# Patient Record
Sex: MALE | Race: WHITE | Employment: UNEMPLOYED | URBAN - METROPOLITAN AREA
[De-identification: names, ages, dates, MRNs, and addresses within clinical notes are randomized per-mention and may not be internally consistent; named-entity substitution may affect disease eponyms.]

---

## 2017-04-10 ENCOUNTER — HOSPITAL ENCOUNTER (OUTPATIENT)
Dept: OTHER | Age: 42
Discharge: HOME | End: 2017-04-10
Attending: EMERGENCY MEDICINE | Admitting: EMERGENCY MEDICINE

## 2017-04-10 LAB
A/G RATIO: 1.2 (ref 1.1–2.2)
ALBUMIN SERPL-MCNC: 4.1 G/DL (ref 3.4–5)
ALP BLD-CCNC: 91 U/L (ref 40–129)
ALT SERPL-CCNC: 20 U/L (ref 10–40)
ANION GAP SERPL CALCULATED.3IONS-SCNC: 14 MMOL/L (ref 3–16)
AST SERPL-CCNC: 27 U/L (ref 15–37)
BASOPHILS ABSOLUTE: 0 K/UL (ref 0–0.2)
BASOPHILS RELATIVE PERCENT: 0.7 %
BILIRUB SERPL-MCNC: 0.3 MG/DL (ref 0–1)
BUN BLDV-MCNC: 11 MG/DL (ref 7–20)
CALCIUM SERPL-MCNC: 9.1 MG/DL (ref 8.3–10.6)
CHLORIDE BLD-SCNC: 99 MMOL/L (ref 99–110)
CO2: 26 MMOL/L (ref 21–32)
CREAT SERPL-MCNC: 0.6 MG/DL (ref 0.9–1.3)
EOSINOPHILS ABSOLUTE: 0.1 K/UL (ref 0–0.6)
EOSINOPHILS RELATIVE PERCENT: 2.2 %
GFR AFRICAN AMERICAN: >60
GFR NON-AFRICAN AMERICAN: >60
GLOBULIN: 3.5 G/DL
GLUCOSE BLD-MCNC: 94 MG/DL (ref 70–99)
HCT VFR BLD CALC: 47.6 % (ref 40.5–52.5)
HEMOGLOBIN: 16.4 G/DL (ref 13.5–17.5)
LYMPHOCYTES ABSOLUTE: 2.5 K/UL (ref 1–5.1)
LYMPHOCYTES RELATIVE PERCENT: 40.6 %
MCH RBC QN AUTO: 33.6 PG (ref 26–34)
MCHC RBC AUTO-ENTMCNC: 34.4 G/DL (ref 31–36)
MCV RBC AUTO: 97.8 FL (ref 80–100)
MONOCYTES ABSOLUTE: 0.7 K/UL (ref 0–1.3)
MONOCYTES RELATIVE PERCENT: 12.2 %
NEUTROPHILS ABSOLUTE: 2.7 K/UL (ref 1.7–7.7)
NEUTROPHILS RELATIVE PERCENT: 44.3 %
PDW BLD-RTO: 12.6 % (ref 12.4–15.4)
PLATELET # BLD: 125 K/UL (ref 135–450)
PMV BLD AUTO: 9.9 FL (ref 5–10.5)
POTASSIUM SERPL-SCNC: 4 MMOL/L (ref 3.5–5.1)
RBC # BLD: 4.87 M/UL (ref 4.2–5.9)
SODIUM BLD-SCNC: 139 MMOL/L (ref 136–145)
T4 FREE: 1.5 NG/DL (ref 0.9–1.8)
TOTAL PROTEIN: 7.6 G/DL (ref 6.4–8.2)
TSH SERPL DL<=0.05 MIU/L-ACNC: 1.45 UIU/ML (ref 0.27–4.2)
WBC # BLD: 6.1 K/UL (ref 4–11)

## 2017-04-11 LAB
HBV SURFACE AB TITR SER: >1000 MUL/ML
HEPATITIS C ANTIBODY INTERPRETATION: REACTIVE
HIV-1 AND HIV-2 ANTIBODIES: NORMAL
RPR: NORMAL

## 2017-04-13 LAB — HEPATITIS B SURFACE ANTIGEN INTERPRETATION: NORMAL

## 2019-11-29 ENCOUNTER — HOSPITAL ENCOUNTER (EMERGENCY)
Age: 44
Discharge: HOME | End: 2019-11-29
Attending: EMERGENCY MEDICINE
Payer: COMMERCIAL

## 2019-11-29 VITALS
SYSTOLIC BLOOD PRESSURE: 141 MMHG | HEIGHT: 70 IN | BODY MASS INDEX: 19.76 KG/M2 | OXYGEN SATURATION: 100 % | DIASTOLIC BLOOD PRESSURE: 92 MMHG | HEART RATE: 67 BPM | WEIGHT: 138 LBS | TEMPERATURE: 97.8 F | RESPIRATION RATE: 14 BRPM

## 2019-11-29 DIAGNOSIS — R07.9 CHEST PAIN, UNSPECIFIED TYPE: Primary | ICD-10-CM

## 2019-11-29 DIAGNOSIS — F19.10 SUBSTANCE ABUSE (HCC): ICD-10-CM

## 2019-11-29 LAB
A/G RATIO: 1.4 (ref 1.1–2.2)
ALBUMIN SERPL-MCNC: 4.9 G/DL (ref 3.4–5)
ALP BLD-CCNC: 105 U/L (ref 40–129)
ALT SERPL-CCNC: 38 U/L (ref 10–40)
ANION GAP SERPL CALCULATED.3IONS-SCNC: 10 MMOL/L (ref 3–16)
AST SERPL-CCNC: 45 U/L (ref 15–37)
BASOPHILS ABSOLUTE: 0 K/UL (ref 0–0.2)
BASOPHILS RELATIVE PERCENT: 0.6 %
BILIRUB SERPL-MCNC: 0.5 MG/DL (ref 0–1)
BUN BLDV-MCNC: 11 MG/DL (ref 7–20)
CALCIUM SERPL-MCNC: 10.2 MG/DL (ref 8.3–10.6)
CHLORIDE BLD-SCNC: 97 MMOL/L (ref 99–110)
CO2: 25 MMOL/L (ref 21–32)
CREAT SERPL-MCNC: 0.8 MG/DL (ref 0.9–1.3)
EKG ATRIAL RATE: 94 BPM
EKG DIAGNOSIS: NORMAL
EKG P AXIS: 72 DEGREES
EKG P-R INTERVAL: 154 MS
EKG Q-T INTERVAL: 346 MS
EKG QRS DURATION: 90 MS
EKG QTC CALCULATION (BAZETT): 432 MS
EKG R AXIS: 8 DEGREES
EKG T AXIS: 55 DEGREES
EKG VENTRICULAR RATE: 94 BPM
EOSINOPHILS ABSOLUTE: 0.1 K/UL (ref 0–0.6)
EOSINOPHILS RELATIVE PERCENT: 1.6 %
GFR AFRICAN AMERICAN: >60
GFR NON-AFRICAN AMERICAN: >60
GLOBULIN: 3.6 G/DL
GLUCOSE BLD-MCNC: 136 MG/DL (ref 70–99)
HCT VFR BLD CALC: 40.3 % (ref 40.5–52.5)
HEMOGLOBIN: 14 G/DL (ref 13.5–17.5)
LYMPHOCYTES ABSOLUTE: 1.8 K/UL (ref 1–5.1)
LYMPHOCYTES RELATIVE PERCENT: 29.8 %
MAGNESIUM: 2 MG/DL (ref 1.8–2.4)
MCH RBC QN AUTO: 32.9 PG (ref 26–34)
MCHC RBC AUTO-ENTMCNC: 34.6 G/DL (ref 31–36)
MCV RBC AUTO: 95.1 FL (ref 80–100)
MONOCYTES ABSOLUTE: 0.5 K/UL (ref 0–1.3)
MONOCYTES RELATIVE PERCENT: 8 %
NEUTROPHILS ABSOLUTE: 3.7 K/UL (ref 1.7–7.7)
NEUTROPHILS RELATIVE PERCENT: 60 %
PDW BLD-RTO: 11.7 % (ref 12.4–15.4)
PLATELET # BLD: 197 K/UL (ref 135–450)
PMV BLD AUTO: 7.5 FL (ref 5–10.5)
POTASSIUM REFLEX MAGNESIUM: 3.1 MMOL/L (ref 3.5–5.1)
RBC # BLD: 4.24 M/UL (ref 4.2–5.9)
SODIUM BLD-SCNC: 132 MMOL/L (ref 136–145)
TOTAL PROTEIN: 8.5 G/DL (ref 6.4–8.2)
TROPONIN: <0.01 NG/ML
TROPONIN: <0.01 NG/ML
WBC # BLD: 6.2 K/UL (ref 4–11)

## 2019-11-29 PROCEDURE — 85025 COMPLETE CBC W/AUTO DIFF WBC: CPT

## 2019-11-29 PROCEDURE — 6370000000 HC RX 637 (ALT 250 FOR IP): Performed by: EMERGENCY MEDICINE

## 2019-11-29 PROCEDURE — 93005 ELECTROCARDIOGRAM TRACING: CPT | Performed by: EMERGENCY MEDICINE

## 2019-11-29 PROCEDURE — 93010 ELECTROCARDIOGRAM REPORT: CPT | Performed by: INTERNAL MEDICINE

## 2019-11-29 PROCEDURE — 83735 ASSAY OF MAGNESIUM: CPT

## 2019-11-29 PROCEDURE — 80053 COMPREHEN METABOLIC PANEL: CPT

## 2019-11-29 PROCEDURE — 84484 ASSAY OF TROPONIN QUANT: CPT

## 2019-11-29 PROCEDURE — 99285 EMERGENCY DEPT VISIT HI MDM: CPT

## 2019-11-29 RX ADMIN — NITROGLYCERIN 1 INCH: 20 OINTMENT TOPICAL at 03:03

## 2019-11-29 SDOH — HEALTH STABILITY: MENTAL HEALTH: HOW OFTEN DO YOU HAVE A DRINK CONTAINING ALCOHOL?: NEVER

## 2019-11-29 ASSESSMENT — PAIN DESCRIPTION - FREQUENCY: FREQUENCY: CONTINUOUS

## 2019-11-29 ASSESSMENT — PAIN DESCRIPTION - PAIN TYPE: TYPE: ACUTE PAIN

## 2019-11-29 ASSESSMENT — PAIN SCALES - GENERAL: PAINLEVEL_OUTOF10: 5

## 2019-11-29 ASSESSMENT — PAIN DESCRIPTION - DESCRIPTORS: DESCRIPTORS: CONSTANT

## 2019-11-29 ASSESSMENT — PAIN DESCRIPTION - LOCATION: LOCATION: CHEST

## 2019-11-29 ASSESSMENT — HEART SCORE: ECG: 1

## 2020-09-07 ENCOUNTER — APPOINTMENT (OUTPATIENT)
Dept: GENERAL RADIOLOGY | Age: 45
DRG: 383 | End: 2020-09-07
Payer: COMMERCIAL

## 2020-09-07 ENCOUNTER — HOSPITAL ENCOUNTER (INPATIENT)
Age: 45
LOS: 1 days | Discharge: HOME | DRG: 383 | End: 2020-09-08
Attending: EMERGENCY MEDICINE | Admitting: INTERNAL MEDICINE
Payer: COMMERCIAL

## 2020-09-07 PROBLEM — M00.9 SEPTIC JOINT (HCC): Status: ACTIVE | Noted: 2020-09-07

## 2020-09-07 LAB
A/G RATIO: 1.3 (ref 1.1–2.2)
ALBUMIN SERPL-MCNC: 4.4 G/DL (ref 3.4–5)
ALP BLD-CCNC: 170 U/L (ref 40–129)
ALT SERPL-CCNC: 29 U/L (ref 10–40)
ANION GAP SERPL CALCULATED.3IONS-SCNC: 6 MMOL/L (ref 3–16)
APPEARANCE FLUID: NORMAL
AST SERPL-CCNC: 31 U/L (ref 15–37)
BASOPHILS ABSOLUTE: 0.1 K/UL (ref 0–0.2)
BASOPHILS RELATIVE PERCENT: 0.7 %
BILIRUB SERPL-MCNC: 0.3 MG/DL (ref 0–1)
BUN BLDV-MCNC: 17 MG/DL (ref 7–20)
C-REACTIVE PROTEIN: 10.6 MG/L (ref 0–5.1)
CALCIUM SERPL-MCNC: 9.6 MG/DL (ref 8.3–10.6)
CELL COUNT FLUID TYPE: NORMAL
CHLORIDE BLD-SCNC: 96 MMOL/L (ref 99–110)
CLOT EVALUATION: NORMAL
CO2: 30 MMOL/L (ref 21–32)
COLOR FLUID: YELLOW
CREAT SERPL-MCNC: 0.7 MG/DL (ref 0.9–1.3)
CRYSTALS, FLUID: NORMAL
EOSINOPHILS ABSOLUTE: 0.1 K/UL (ref 0–0.6)
EOSINOPHILS RELATIVE PERCENT: 0.9 %
GFR AFRICAN AMERICAN: >60
GFR NON-AFRICAN AMERICAN: >60
GLOBULIN: 3.5 G/DL
GLUCOSE BLD-MCNC: 110 MG/DL (ref 70–99)
HCT VFR BLD CALC: 37.3 % (ref 40.5–52.5)
HEMOGLOBIN: 12.3 G/DL (ref 13.5–17.5)
LACTIC ACID: 1.3 MMOL/L (ref 0.4–2)
LYMPHOCYTES ABSOLUTE: 1.3 K/UL (ref 1–5.1)
LYMPHOCYTES RELATIVE PERCENT: 18 %
LYMPHOCYTES, BODY FLUID: 3 %
MCH RBC QN AUTO: 30.4 PG (ref 26–34)
MCHC RBC AUTO-ENTMCNC: 33.1 G/DL (ref 31–36)
MCV RBC AUTO: 91.7 FL (ref 80–100)
MONOCYTE, FLUID: 6 %
MONOCYTES ABSOLUTE: 0.7 K/UL (ref 0–1.3)
MONOCYTES RELATIVE PERCENT: 10.3 %
NEUTROPHIL, FLUID: 91 %
NEUTROPHILS ABSOLUTE: 5 K/UL (ref 1.7–7.7)
NEUTROPHILS RELATIVE PERCENT: 70.1 %
NUCLEATED CELLS FLUID: NORMAL /CUMM
NUMBER OF CELLS COUNTED FLUID: 100
PDW BLD-RTO: 12 % (ref 12.4–15.4)
PLATELET # BLD: 208 K/UL (ref 135–450)
PMV BLD AUTO: 7.1 FL (ref 5–10.5)
POTASSIUM REFLEX MAGNESIUM: 4.4 MMOL/L (ref 3.5–5.1)
RBC # BLD: 4.06 M/UL (ref 4.2–5.9)
RBC FLUID: 3400 /CUMM
SEDIMENTATION RATE, ERYTHROCYTE: 52 MM/HR (ref 0–15)
SODIUM BLD-SCNC: 132 MMOL/L (ref 136–145)
SOURCE BODY FLUID: NORMAL
TOTAL PROTEIN: 7.9 G/DL (ref 6.4–8.2)
WBC # BLD: 7.2 K/UL (ref 4–11)

## 2020-09-07 PROCEDURE — 6360000002 HC RX W HCPCS: Performed by: INTERNAL MEDICINE

## 2020-09-07 PROCEDURE — 85025 COMPLETE CBC W/AUTO DIFF WBC: CPT

## 2020-09-07 PROCEDURE — 83605 ASSAY OF LACTIC ACID: CPT

## 2020-09-07 PROCEDURE — 85652 RBC SED RATE AUTOMATED: CPT

## 2020-09-07 PROCEDURE — 6370000000 HC RX 637 (ALT 250 FOR IP): Performed by: INTERNAL MEDICINE

## 2020-09-07 PROCEDURE — 1200000000 HC SEMI PRIVATE

## 2020-09-07 PROCEDURE — 20610 DRAIN/INJ JOINT/BURSA W/O US: CPT

## 2020-09-07 PROCEDURE — 6360000002 HC RX W HCPCS: Performed by: PHYSICIAN ASSISTANT

## 2020-09-07 PROCEDURE — 6360000002 HC RX W HCPCS: Performed by: EMERGENCY MEDICINE

## 2020-09-07 PROCEDURE — 2580000003 HC RX 258: Performed by: PHYSICIAN ASSISTANT

## 2020-09-07 PROCEDURE — 87205 SMEAR GRAM STAIN: CPT

## 2020-09-07 PROCEDURE — 6370000000 HC RX 637 (ALT 250 FOR IP): Performed by: EMERGENCY MEDICINE

## 2020-09-07 PROCEDURE — 89060 EXAM SYNOVIAL FLUID CRYSTALS: CPT

## 2020-09-07 PROCEDURE — 87077 CULTURE AEROBIC IDENTIFY: CPT

## 2020-09-07 PROCEDURE — 73562 X-RAY EXAM OF KNEE 3: CPT

## 2020-09-07 PROCEDURE — 96374 THER/PROPH/DIAG INJ IV PUSH: CPT

## 2020-09-07 PROCEDURE — 99285 EMERGENCY DEPT VISIT HI MDM: CPT

## 2020-09-07 PROCEDURE — 86140 C-REACTIVE PROTEIN: CPT

## 2020-09-07 PROCEDURE — 87070 CULTURE OTHR SPECIMN AEROBIC: CPT

## 2020-09-07 PROCEDURE — 87186 SC STD MICRODIL/AGAR DIL: CPT

## 2020-09-07 PROCEDURE — 80053 COMPREHEN METABOLIC PANEL: CPT

## 2020-09-07 PROCEDURE — 89051 BODY FLUID CELL COUNT: CPT

## 2020-09-07 RX ORDER — KETOROLAC TROMETHAMINE 30 MG/ML
30 INJECTION, SOLUTION INTRAMUSCULAR; INTRAVENOUS EVERY 6 HOURS PRN
Status: DISCONTINUED | OUTPATIENT
Start: 2020-09-07 | End: 2020-09-08 | Stop reason: HOSPADM

## 2020-09-07 RX ORDER — ACETAMINOPHEN 325 MG/1
650 TABLET ORAL EVERY 4 HOURS PRN
Status: DISCONTINUED | OUTPATIENT
Start: 2020-09-07 | End: 2020-09-07

## 2020-09-07 RX ORDER — KETOROLAC TROMETHAMINE 30 MG/ML
15 INJECTION, SOLUTION INTRAMUSCULAR; INTRAVENOUS ONCE
Status: COMPLETED | OUTPATIENT
Start: 2020-09-07 | End: 2020-09-07

## 2020-09-07 RX ORDER — ONDANSETRON 2 MG/ML
4 INJECTION INTRAMUSCULAR; INTRAVENOUS EVERY 6 HOURS PRN
Status: DISCONTINUED | OUTPATIENT
Start: 2020-09-07 | End: 2020-09-08 | Stop reason: HOSPADM

## 2020-09-07 RX ORDER — ACETAMINOPHEN 650 MG/1
650 SUPPOSITORY RECTAL EVERY 6 HOURS PRN
Status: DISCONTINUED | OUTPATIENT
Start: 2020-09-07 | End: 2020-09-08 | Stop reason: HOSPADM

## 2020-09-07 RX ORDER — ACETAMINOPHEN 325 MG/1
650 TABLET ORAL EVERY 6 HOURS PRN
Status: DISCONTINUED | OUTPATIENT
Start: 2020-09-07 | End: 2020-09-08 | Stop reason: HOSPADM

## 2020-09-07 RX ORDER — SODIUM CHLORIDE 0.9 % (FLUSH) 0.9 %
10 SYRINGE (ML) INJECTION PRN
Status: DISCONTINUED | OUTPATIENT
Start: 2020-09-07 | End: 2020-09-08 | Stop reason: HOSPADM

## 2020-09-07 RX ORDER — POLYETHYLENE GLYCOL 3350 17 G/17G
17 POWDER, FOR SOLUTION ORAL DAILY PRN
Status: DISCONTINUED | OUTPATIENT
Start: 2020-09-07 | End: 2020-09-08 | Stop reason: HOSPADM

## 2020-09-07 RX ORDER — SODIUM CHLORIDE 9 MG/ML
INJECTION, SOLUTION INTRAVENOUS
Status: DISPENSED
Start: 2020-09-07 | End: 2020-09-08

## 2020-09-07 RX ORDER — PROMETHAZINE HYDROCHLORIDE 25 MG/1
12.5 TABLET ORAL EVERY 6 HOURS PRN
Status: DISCONTINUED | OUTPATIENT
Start: 2020-09-07 | End: 2020-09-08 | Stop reason: HOSPADM

## 2020-09-07 RX ORDER — SODIUM CHLORIDE 0.9 % (FLUSH) 0.9 %
10 SYRINGE (ML) INJECTION EVERY 12 HOURS SCHEDULED
Status: DISCONTINUED | OUTPATIENT
Start: 2020-09-07 | End: 2020-09-08 | Stop reason: HOSPADM

## 2020-09-07 RX ADMIN — BUPRENORPHINE HCL 10 MG: 8 TABLET SUBLINGUAL at 20:57

## 2020-09-07 RX ADMIN — ACETAMINOPHEN 650 MG: 325 TABLET ORAL at 08:30

## 2020-09-07 RX ADMIN — KETOROLAC TROMETHAMINE 30 MG: 30 INJECTION, SOLUTION INTRAMUSCULAR at 23:30

## 2020-09-07 RX ADMIN — Medication 10 ML: at 20:58

## 2020-09-07 RX ADMIN — CEFEPIME HYDROCHLORIDE 2 G: 2 INJECTION, POWDER, FOR SOLUTION INTRAVENOUS at 16:56

## 2020-09-07 RX ADMIN — KETOROLAC TROMETHAMINE 15 MG: 30 INJECTION, SOLUTION INTRAMUSCULAR at 08:30

## 2020-09-07 RX ADMIN — VANCOMYCIN HYDROCHLORIDE 1.5 G: 10 INJECTION, POWDER, LYOPHILIZED, FOR SOLUTION INTRAVENOUS at 18:45

## 2020-09-07 RX ADMIN — KETOROLAC TROMETHAMINE 30 MG: 30 INJECTION, SOLUTION INTRAMUSCULAR at 16:57

## 2020-09-07 RX ADMIN — ENOXAPARIN SODIUM 40 MG: 40 INJECTION SUBCUTANEOUS at 16:57

## 2020-09-07 ASSESSMENT — PAIN DESCRIPTION - LOCATION
LOCATION: KNEE

## 2020-09-07 ASSESSMENT — PAIN DESCRIPTION - PAIN TYPE
TYPE: ACUTE PAIN

## 2020-09-07 ASSESSMENT — PAIN DESCRIPTION - ORIENTATION
ORIENTATION: RIGHT

## 2020-09-07 ASSESSMENT — VISUAL ACUITY
OD: 20/25
OU: 20/13
OS: 20/40

## 2020-09-07 ASSESSMENT — PAIN SCALES - GENERAL
PAINLEVEL_OUTOF10: 5
PAINLEVEL_OUTOF10: 10
PAINLEVEL_OUTOF10: 0
PAINLEVEL_OUTOF10: 5
PAINLEVEL_OUTOF10: 6
PAINLEVEL_OUTOF10: 10

## 2020-09-07 NOTE — ED PROVIDER NOTES
file     Emotionally abused: Not on file     Physically abused: Not on file     Forced sexual activity: Not on file   Other Topics Concern    Not on file   Social History Narrative    Not on file     No current facility-administered medications for this encounter. No current outpatient medications on file.      Facility-Administered Medications Ordered in Other Encounters   Medication Dose Route Frequency Provider Last Rate Last Dose    lactated ringers infusion   Intravenous Continuous Betzaida Vivar MD        lidocaine PF 1 % injection 1 mL  1 mL Intradermal Once PRN Betzaida Vivar MD        sodium chloride flush 0.9 % injection 10 mL  10 mL Intravenous 2 times per day Betzaida Vivar MD   10 mL at 09/08/20 1227    sodium chloride flush 0.9 % injection 10 mL  10 mL Intravenous PRN Betzaida Vivar MD        acetaminophen (TYLENOL) tablet 650 mg  650 mg Oral Q6H PRN Vernon Amezcua MD   650 mg at 09/08/20 1227    Or    acetaminophen (TYLENOL) suppository 650 mg  650 mg Rectal Q6H PRN Vernon Amezcua MD        polyethylene glycol (GLYCOLAX) packet 17 g  17 g Oral Daily PRN Vernon Amezcua MD        promethazine (PHENERGAN) tablet 12.5 mg  12.5 mg Oral Q6H PRN Vernon Amezcua MD        Or    ondansetron [facility]) injection 4 mg  4 mg Intravenous Q6H PRN Vernon Amezcua MD        enoxaparin (LOVENOX) injection 40 mg  40 mg Subcutaneous Daily Vernon Amezcua MD        ketorolac (TORADOL) injection 30 mg  30 mg Intravenous Q6H PRN Vernon Amezcua MD        cefepime (MAXIPIME) 2 g IVPB minibag  2 g Intravenous Q12H Vernon Amezcua MD        vancomycin 1000 mg IVPB in 250 mL D5W addavial  1,000 mg Intravenous Q12H Vernon Amezcua MD        buprenorphine (SUBUTEX) SL tablet 10 mg  10 mg Sublingual BID Vernon Amezcua MD        0.9 % sodium chloride infusion   Intravenous Continuous Vernon Amezcua MD 75 mL/hr at 09/08/20 1327      famotidine (PEPCID) tablet 20 mg  20 mg Oral BID Vernon Amezcua MD 20 mg at 09/08/20 1227    morphine (PF) injection 2 mg  2 mg Intravenous Q4H PRN Eloise Tinoco MD   2 mg at 09/08/20 1325     Allergies   Allergen Reactions    Ciprofloxacin Other (See Comments)     Passed out       REVIEW OF SYSTEMS  10 systems reviewed, pertinent positives per HPI otherwise noted to be negative. PHYSICAL EXAM  /87   Pulse 77   Temp 97.3 °F (36.3 °C) (Oral)   Resp 18   Ht 5' 9\" (1.753 m)   Wt 139 lb (63 kg)   SpO2 98%   BMI 20.53 kg/m²    GENERAL APPEARANCE: Awake and alert. Cooperative. No acute distress. HENT: Normocephalic. Atraumatic. CN  2-12 grossly intact. HEART/CHEST: RRR. No murmurs appreciated  LUNGS: Respirations unlabored. Speaking comfortably in full sentences. CTAB. ABDOMEN: Soft, non-distended abdomen. Non tender to palpation. No guarding. No rebound. EXTREMITIES: 2 cm laceration present over the right medial and upper knee. Edema and warmth over the right knee. No overlying erythema. Decreased flexion at the right knee due to pain. Able to move all toes. 2+ DP and radial pulses bilaterally. Sensation intact all extremities. [facility] SKIN: Warm and dry. No acute rashes. NEUROLOGICAL: Alert and oriented. CN's 2-12 intact. No gross facial drooping. Strength 5/5, sensation intact. PSYCHIATRIC: Normal mood and affect. LABS  Results for orders placed or performed during the hospital encounter of 09/07/20   Culture, Body Fluid    Specimen: Synovial Fluid;  Body Fluid   Result Value Ref Range    Body Fluid Culture, Sterile No growth to date     Gram Stain Result 4+ WBC's (Polymorphonuclear)  No organisms seen      CBC auto differential   Result Value Ref Range    WBC 7.2 4.0 - 11.0 K/uL    RBC 4.06 (L) 4.20 - 5.90 M/uL    Hemoglobin 12.3 (L) 13.5 - 17.5 g/dL    Hematocrit 37.3 (L) 40.5 - 52.5 %    MCV 91.7 80.0 - 100.0 fL    MCH 30.4 26.0 - 34.0 pg    MCHC 33.1 31.0 - 36.0 g/dL    RDW 12.0 (L) 12.4 - 15.4 %    Platelets 503 621 - 091 K/uL    MPV 7.1 5.0 - 10.5 fL Neutrophils % 70.1 %    Lymphocytes % 18.0 %    Monocytes % 10.3 %    Eosinophils % 0.9 %    Basophils % 0.7 %    Neutrophils Absolute 5.0 1.7 - 7.7 K/uL    Lymphocytes Absolute 1.3 1.0 - 5.1 K/uL    Monocytes Absolute 0.7 0.0 - 1.3 K/uL    Eosinophils Absolute 0.1 0.0 - 0.6 K/uL    Basophils Absolute 0.1 0.0 - 0.2 K/uL   Comprehensive Metabolic Panel w/ Reflex to MG   Result Value Ref Range    Sodium 132 (L) 136 - 145 mmol/L    Potassium reflex Magnesium 4.4 3.5 - 5.1 mmol/L    Chloride 96 (L) 99 - 110 mmol/L    CO2 30 21 - 32 mmol/L    Anion Gap 6 3 - 16    Glucose 110 (H) 70 - 99 mg/dL    BUN 17 7 - 20 mg/dL    CREATININE 0.7 (L) 0.9 - 1.3 mg/dL    GFR Non-African American >60 >60    GFR African American >60 >60    Calcium 9.6 8.3 - 10.6 mg/dL    Total Protein 7.9 6.4 - 8.2 g/dL    Alb 4.4 3.4 - 5.0 g/dL    Albumin/Globulin Ratio 1.3 1.1 - 2.2    Total Bilirubin 0.3 0.0 - 1.0 mg/dL    Alkaline Phosphatase 170 (H) 40 - 129 U/L    ALT 29 10 - 40 U/L    AST 31 15 - 37 U/L    Globulin 3.5 g/dL   Lactic acid, plasma   Result Value Ref Range    Lactic Acid 1.3 0.4 - 2.0 mmol/L   Sedimentation Rate   Result Value Ref Range    Sed Rate 52 (H) 0 - 15 mm/Hr   C-reactive protein   Result Value Ref Range    CRP 10.6 (H) 0.0 - 5.1 mg/L   Body fluid cell count with differential   Result Value Ref Range    Cell Count Fluid Type Knee   Right     Color, Fluid Yellow     Appearance, Fluid Hazy     Clot Eval. see below     Nucl Cell, Fluid 55,978 /cumm    RBC, Fluid 3,400 /cumm    Neutrophil Count, Fluid 91 %    Lymphocytes, Body Fluid 3 %    Monocyte Count, Fluid 6 %    Number of Cells Counted Fluid 100    Body fluid crystal   Result Value Ref Range    Source Body Fluid Knee   Right     Crystals, Fluid None Seen    Basic Metabolic Panel w/ Reflex to MG   Result Value Ref Range    Sodium 134 (L) 136 - 145 mmol/L    Potassium reflex Magnesium 4.6 3.5 - 5.1 mmol/L    Chloride 99 99 - 110 mmol/L    CO2 27 21 - 32 mmol/L    Anion Gap 8 3 - 16    Glucose 96 70 - 99 mg/dL    BUN 20 7 - 20 mg/dL    CREATININE 0.7 (L) 0.9 - 1.3 mg/dL    GFR Non-African American >60 >60    GFR African American >60 >60    Calcium 8.8 8.3 - 10.6 mg/dL   CBC auto differential   Result Value Ref Range    WBC 7.1 4.0 - 11.0 K/uL    RBC 3.95 (L) 4.20 - 5.90 M/uL    Hemoglobin 12.6 (L) 13.5 - 17.5 g/dL    Hematocrit 37.3 (L) 40.5 - 52.5 %    MCV 94.5 80.0 - 100.0 fL    MCH 31.9 26.0 - 34.0 pg    MCHC 33.8 31.0 - 36.0 g/dL    RDW 12.1 (L) 12.4 - 15.4 %    Platelets 105 349 - 841 K/uL    MPV 7.5 5.0 - 10.5 fL    Neutrophils % 55.0 %    Lymphocytes % 29.5 %    Monocytes % 12.4 %    Eosinophils % 1.5 %    Basophils % 1.6 %    Neutrophils Absolute 3.9 1.7 - 7.7 K/uL    Lymphocytes Absolute 2.1 1.0 - 5.1 K/uL    Monocytes Absolute 0.9 0.0 - 1.3 K/uL    Eosinophils Absolute 0.1 0.0 - 0.6 K/uL    Basophils Absolute 0.1 0.0 - 0.2 K/uL       I have reviewed all labs for this visit. RADIOLOGY  Xr Knee Right (3 Views)    Result Date: 9/7/2020  EXAMINATION: THREE XRAY VIEWS OF THE RIGHT KNEE 9/7/2020 8:28 am COMPARISON: None. HISTORY: ORDERING SYSTEM PROVIDED HISTORY: pain TECHNOLOGIST PROVIDED HISTORY: Reason for exam:->pain Reason for Exam: laceration one week ago, pain and swelling started one day ago Acuity: Acute Type of Exam: Initial Remote history of osteomyelitis in the femur. FINDINGS: There is severe soft tissue swelling anterior to the distal shaft of the femur with questionable suprapatellar joint effusion but is difficult to differentiate from more superficial swelling. No subcutaneous gas or ulceration. Site of laceration cannot be clearly identified. There is abnormal decreased density extending from the diaphysis of the femur distally to the intercondylar portion. This appearance has progressed since 2013 comparison plain film. Prior MRI evaluation has made characterisation of osteomyelitis in 2012.   Current findings may represent sequela of remote/chronic injection 15 mg (15 mg Intravenous Given 9/7/20 0830)   vancomycin 1.5 g in dextrose 5% 300 mL IVPB (0 g Intravenous Stopped 9/7/20 2045)        CLINICAL IMPRESSION  1. Pain and swelling of knee, right        Blood pressure 133/87, pulse 77, temperature 97.3 °F (36.3 °C), temperature source Oral, resp. rate 18, height 5' 9\" (1.753 m), weight 139 lb (63 kg), SpO2 98 %. DISPOSITION  Mulugeta Kauffmankeeper was admitted. Patient was given scripts for the following medications. I counseled patient how to take these medications. Discharge Medication List as of 9/8/2020  8:57 AM          Follow-up with:  Lillie Barrera, APRN - CNP  [address] [facility] [id number]  [place] [id number] [phone]            DISCLAIMER: This chart was created using Dragon dictation software. Efforts were made by me to ensure accuracy, however some errors may be present due to limitations of this technology and occasionally words are not transcribed correctly.         Alf Lux MD  09/08/20 1923

## 2020-09-07 NOTE — CONSULTS
Pharmacy Note  Vancomycin Consult    Melva Lema is a 40 y.o. male started on Vancomycin for osteomyelitis; consult received from Rita Murphy, to manage therapy. Also receiving the following antibiotics: Cefepime. Patient Active Problem List   Diagnosis    GIB (gastrointestinal bleeding)    Duodenal ulcer    Chronic knee pain    Hepatitis C    Tobacco abuse    Knee pain    Plica of knee    Chondromalacia of right knee    Plica syndrome of right knee    Status post arthroscopy of knee    Septic joint (HCC)       Allergies:  Ciprofloxacin     Temp max: 97 degrees F    Recent Labs     09/07/20  0818   BUN 17       Recent Labs     09/07/20  0818   CREATININE 0.7*       Recent Labs     09/07/20  0818   WBC 7.2       No intake or output data in the 24 hours ending 09/07/20 1649    Culture Date      Source                       Results      Ht Readings from Last 1 Encounters:   09/07/20 5' 9\" (1.753 m)        Wt Readings from Last 1 Encounters:   09/07/20 139 lb (63 kg)         Body mass index is 20.53 kg/m². Estimated Creatinine Clearance: 120 mL/min (A) (based on SCr of 0.7 mg/dL (L)). Goal Trough Level: 15-20 mcg/mL    Assessment/Plan:  Will initiate Vancomycin with a one time loading dose of 1500 mg x1, followed by 1000 mg IV every 12 hours. Timing of trough level will be determined based on culture results, renal function, and clinical response. Thank you for the consult. Will continue to follow.   TOMMY Horan.Ph.9/7/20204:51 PM

## 2020-09-07 NOTE — PROGRESS NOTES
Pt assisted to unit via w/c, stand by assist with tranfers, pt unable to put muc wt on righ tleg due to laceration to knee pain. Oriented to room and unit, reviewed plan of care. Admission assessment complete. Call light in reach.

## 2020-09-07 NOTE — ED NOTES
Report given to [facility]. Evan Che transporting pt to floor via wheelchair.      Kori Galaviz RN  09/07/20 2192

## 2020-09-07 NOTE — PROGRESS NOTES
Consult has been called to Christine anderson  on 9/7/20. P.s .  Christine anderson 4:59 PM    Boone Quesada  9/7/2020

## 2020-09-07 NOTE — PLAN OF CARE
Admit to Med Surg    Right Knee Swelling  - poss septic joint  - poss osteo - old sequela vs acute on XR; pt has prior hx of osteo  - hx of IVDA, has been clean for several years

## 2020-09-08 ENCOUNTER — HOSPITAL ENCOUNTER (INPATIENT)
Age: 45
LOS: 3 days | Discharge: HOME | DRG: 313 | End: 2020-09-11
Attending: HOSPITALIST | Admitting: HOSPITALIST
Payer: COMMERCIAL

## 2020-09-08 ENCOUNTER — ANESTHESIA EVENT (OUTPATIENT)
Dept: OPERATING ROOM | Age: 45
DRG: 313 | End: 2020-09-08
Payer: COMMERCIAL

## 2020-09-08 ENCOUNTER — APPOINTMENT (OUTPATIENT)
Dept: MRI IMAGING | Age: 45
DRG: 313 | End: 2020-09-08
Attending: HOSPITALIST
Payer: COMMERCIAL

## 2020-09-08 VITALS
RESPIRATION RATE: 18 BRPM | HEART RATE: 77 BPM | TEMPERATURE: 97.3 F | OXYGEN SATURATION: 98 % | BODY MASS INDEX: 20.59 KG/M2 | HEIGHT: 69 IN | WEIGHT: 139 LBS | DIASTOLIC BLOOD PRESSURE: 87 MMHG | SYSTOLIC BLOOD PRESSURE: 133 MMHG

## 2020-09-08 PROBLEM — M00.9 SEPTIC ARTHRITIS (HCC): Status: ACTIVE | Noted: 2020-09-08

## 2020-09-08 LAB
ANION GAP SERPL CALCULATED.3IONS-SCNC: 8 MMOL/L (ref 3–16)
BASOPHILS ABSOLUTE: 0.1 K/UL (ref 0–0.2)
BASOPHILS RELATIVE PERCENT: 1.6 %
BUN BLDV-MCNC: 20 MG/DL (ref 7–20)
CALCIUM SERPL-MCNC: 8.8 MG/DL (ref 8.3–10.6)
CHLORIDE BLD-SCNC: 99 MMOL/L (ref 99–110)
CO2: 27 MMOL/L (ref 21–32)
CREAT SERPL-MCNC: 0.7 MG/DL (ref 0.9–1.3)
EOSINOPHILS ABSOLUTE: 0.1 K/UL (ref 0–0.6)
EOSINOPHILS RELATIVE PERCENT: 1.5 %
GFR AFRICAN AMERICAN: >60
GFR NON-AFRICAN AMERICAN: >60
GLUCOSE BLD-MCNC: 96 MG/DL (ref 70–99)
HCT VFR BLD CALC: 37.3 % (ref 40.5–52.5)
HEMOGLOBIN: 12.6 G/DL (ref 13.5–17.5)
LYMPHOCYTES ABSOLUTE: 2.1 K/UL (ref 1–5.1)
LYMPHOCYTES RELATIVE PERCENT: 29.5 %
MCH RBC QN AUTO: 31.9 PG (ref 26–34)
MCHC RBC AUTO-ENTMCNC: 33.8 G/DL (ref 31–36)
MCV RBC AUTO: 94.5 FL (ref 80–100)
MONOCYTES ABSOLUTE: 0.9 K/UL (ref 0–1.3)
MONOCYTES RELATIVE PERCENT: 12.4 %
NEUTROPHILS ABSOLUTE: 3.9 K/UL (ref 1.7–7.7)
NEUTROPHILS RELATIVE PERCENT: 55 %
PDW BLD-RTO: 12.1 % (ref 12.4–15.4)
PLATELET # BLD: 170 K/UL (ref 135–450)
PMV BLD AUTO: 7.5 FL (ref 5–10.5)
POTASSIUM REFLEX MAGNESIUM: 4.6 MMOL/L (ref 3.5–5.1)
RBC # BLD: 3.95 M/UL (ref 4.2–5.9)
SODIUM BLD-SCNC: 134 MMOL/L (ref 136–145)
WBC # BLD: 7.1 K/UL (ref 4–11)

## 2020-09-08 PROCEDURE — 36415 COLL VENOUS BLD VENIPUNCTURE: CPT

## 2020-09-08 PROCEDURE — 6360000002 HC RX W HCPCS: Performed by: INTERNAL MEDICINE

## 2020-09-08 PROCEDURE — 80048 BASIC METABOLIC PNL TOTAL CA: CPT

## 2020-09-08 PROCEDURE — 99239 HOSP IP/OBS DSCHRG MGMT >30: CPT | Performed by: INTERNAL MEDICINE

## 2020-09-08 PROCEDURE — 6370000000 HC RX 637 (ALT 250 FOR IP): Performed by: INTERNAL MEDICINE

## 2020-09-08 PROCEDURE — 2580000003 HC RX 258: Performed by: ANESTHESIOLOGY

## 2020-09-08 PROCEDURE — 2580000003 HC RX 258: Performed by: INTERNAL MEDICINE

## 2020-09-08 PROCEDURE — 87040 BLOOD CULTURE FOR BACTERIA: CPT

## 2020-09-08 PROCEDURE — 6360000002 HC RX W HCPCS: Performed by: PHYSICIAN ASSISTANT

## 2020-09-08 PROCEDURE — 85025 COMPLETE CBC W/AUTO DIFF WBC: CPT

## 2020-09-08 PROCEDURE — 99254 IP/OBS CNSLTJ NEW/EST MOD 60: CPT | Performed by: INTERNAL MEDICINE

## 2020-09-08 PROCEDURE — 2580000003 HC RX 258: Performed by: PHYSICIAN ASSISTANT

## 2020-09-08 PROCEDURE — 1200000000 HC SEMI PRIVATE

## 2020-09-08 RX ORDER — ACETAMINOPHEN 650 MG/1
650 SUPPOSITORY RECTAL EVERY 6 HOURS PRN
Status: DISCONTINUED | OUTPATIENT
Start: 2020-09-08 | End: 2020-09-12 | Stop reason: HOSPADM

## 2020-09-08 RX ORDER — POLYETHYLENE GLYCOL 3350 17 G/17G
17 POWDER, FOR SOLUTION ORAL DAILY PRN
Status: DISCONTINUED | OUTPATIENT
Start: 2020-09-08 | End: 2020-09-08 | Stop reason: SDUPTHER

## 2020-09-08 RX ORDER — ONDANSETRON 2 MG/ML
4 INJECTION INTRAMUSCULAR; INTRAVENOUS EVERY 6 HOURS PRN
Status: DISCONTINUED | OUTPATIENT
Start: 2020-09-08 | End: 2020-09-12 | Stop reason: HOSPADM

## 2020-09-08 RX ORDER — SODIUM CHLORIDE 0.9 % (FLUSH) 0.9 %
10 SYRINGE (ML) INJECTION EVERY 12 HOURS SCHEDULED
Status: DISCONTINUED | OUTPATIENT
Start: 2020-09-08 | End: 2020-09-08 | Stop reason: SDUPTHER

## 2020-09-08 RX ORDER — KETOROLAC TROMETHAMINE 30 MG/ML
30 INJECTION, SOLUTION INTRAMUSCULAR; INTRAVENOUS EVERY 6 HOURS PRN
Status: DISCONTINUED | OUTPATIENT
Start: 2020-09-08 | End: 2020-09-12 | Stop reason: HOSPADM

## 2020-09-08 RX ORDER — SODIUM CHLORIDE, SODIUM LACTATE, POTASSIUM CHLORIDE, CALCIUM CHLORIDE 600; 310; 30; 20 MG/100ML; MG/100ML; MG/100ML; MG/100ML
INJECTION, SOLUTION INTRAVENOUS CONTINUOUS
Status: DISCONTINUED | OUTPATIENT
Start: 2020-09-08 | End: 2020-09-09

## 2020-09-08 RX ORDER — SODIUM CHLORIDE 0.9 % (FLUSH) 0.9 %
10 SYRINGE (ML) INJECTION PRN
Status: DISCONTINUED | OUTPATIENT
Start: 2020-09-08 | End: 2020-09-08 | Stop reason: SDUPTHER

## 2020-09-08 RX ORDER — PROMETHAZINE HYDROCHLORIDE 25 MG/1
12.5 TABLET ORAL EVERY 6 HOURS PRN
Status: DISCONTINUED | OUTPATIENT
Start: 2020-09-08 | End: 2020-09-08 | Stop reason: SDUPTHER

## 2020-09-08 RX ORDER — ACETAMINOPHEN 650 MG/1
650 SUPPOSITORY RECTAL EVERY 6 HOURS PRN
Status: DISCONTINUED | OUTPATIENT
Start: 2020-09-08 | End: 2020-09-08 | Stop reason: SDUPTHER

## 2020-09-08 RX ORDER — PROMETHAZINE HYDROCHLORIDE 25 MG/1
12.5 TABLET ORAL EVERY 6 HOURS PRN
Status: DISCONTINUED | OUTPATIENT
Start: 2020-09-08 | End: 2020-09-12 | Stop reason: HOSPADM

## 2020-09-08 RX ORDER — LIDOCAINE HYDROCHLORIDE 10 MG/ML
1 INJECTION, SOLUTION EPIDURAL; INFILTRATION; INTRACAUDAL; PERINEURAL
Status: ACTIVE | OUTPATIENT
Start: 2020-09-08 | End: 2020-09-08

## 2020-09-08 RX ORDER — SODIUM CHLORIDE 0.9 % (FLUSH) 0.9 %
10 SYRINGE (ML) INJECTION PRN
Status: DISCONTINUED | OUTPATIENT
Start: 2020-09-08 | End: 2020-09-09 | Stop reason: HOSPADM

## 2020-09-08 RX ORDER — ONDANSETRON 2 MG/ML
4 INJECTION INTRAMUSCULAR; INTRAVENOUS EVERY 6 HOURS PRN
Status: DISCONTINUED | OUTPATIENT
Start: 2020-09-08 | End: 2020-09-08 | Stop reason: SDUPTHER

## 2020-09-08 RX ORDER — OXYCODONE HYDROCHLORIDE AND ACETAMINOPHEN 5; 325 MG/1; MG/1
1 TABLET ORAL EVERY 4 HOURS PRN
Status: DISCONTINUED | OUTPATIENT
Start: 2020-09-08 | End: 2020-09-08

## 2020-09-08 RX ORDER — ACETAMINOPHEN 325 MG/1
650 TABLET ORAL EVERY 6 HOURS PRN
Status: DISCONTINUED | OUTPATIENT
Start: 2020-09-08 | End: 2020-09-12 | Stop reason: HOSPADM

## 2020-09-08 RX ORDER — POLYETHYLENE GLYCOL 3350 17 G/17G
17 POWDER, FOR SOLUTION ORAL DAILY PRN
Status: DISCONTINUED | OUTPATIENT
Start: 2020-09-08 | End: 2020-09-12 | Stop reason: HOSPADM

## 2020-09-08 RX ORDER — ACETAMINOPHEN 325 MG/1
650 TABLET ORAL EVERY 6 HOURS PRN
Status: DISCONTINUED | OUTPATIENT
Start: 2020-09-08 | End: 2020-09-08 | Stop reason: SDUPTHER

## 2020-09-08 RX ORDER — SODIUM CHLORIDE 9 MG/ML
INJECTION, SOLUTION INTRAVENOUS CONTINUOUS
Status: DISCONTINUED | OUTPATIENT
Start: 2020-09-08 | End: 2020-09-12 | Stop reason: HOSPADM

## 2020-09-08 RX ORDER — OXYCODONE HYDROCHLORIDE AND ACETAMINOPHEN 5; 325 MG/1; MG/1
2 TABLET ORAL EVERY 4 HOURS PRN
Status: DISCONTINUED | OUTPATIENT
Start: 2020-09-08 | End: 2020-09-08

## 2020-09-08 RX ORDER — SODIUM CHLORIDE 0.9 % (FLUSH) 0.9 %
10 SYRINGE (ML) INJECTION EVERY 12 HOURS SCHEDULED
Status: DISCONTINUED | OUTPATIENT
Start: 2020-09-08 | End: 2020-09-09 | Stop reason: HOSPADM

## 2020-09-08 RX ORDER — MORPHINE SULFATE 2 MG/ML
2 INJECTION, SOLUTION INTRAMUSCULAR; INTRAVENOUS EVERY 4 HOURS PRN
Status: DISCONTINUED | OUTPATIENT
Start: 2020-09-08 | End: 2020-09-09

## 2020-09-08 RX ORDER — FAMOTIDINE 20 MG/1
20 TABLET, FILM COATED ORAL 2 TIMES DAILY
Status: DISCONTINUED | OUTPATIENT
Start: 2020-09-08 | End: 2020-09-12 | Stop reason: HOSPADM

## 2020-09-08 RX ADMIN — CEFEPIME HYDROCHLORIDE 2 G: 2 INJECTION, POWDER, FOR SOLUTION INTRAVENOUS at 05:01

## 2020-09-08 RX ADMIN — BUPRENORPHINE HCL 10 MG: 8 TABLET SUBLINGUAL at 20:29

## 2020-09-08 RX ADMIN — Medication 10 ML: at 08:00

## 2020-09-08 RX ADMIN — VANCOMYCIN HYDROCHLORIDE 1000 MG: 1 INJECTION, POWDER, LYOPHILIZED, FOR SOLUTION INTRAVENOUS at 18:05

## 2020-09-08 RX ADMIN — CEFEPIME 2 G: 2 INJECTION, POWDER, FOR SOLUTION INTRAVENOUS at 17:26

## 2020-09-08 RX ADMIN — SODIUM CHLORIDE: 9 INJECTION, SOLUTION INTRAVENOUS at 13:27

## 2020-09-08 RX ADMIN — BUPRENORPHINE HCL 10 MG: 8 TABLET SUBLINGUAL at 08:07

## 2020-09-08 RX ADMIN — ENOXAPARIN SODIUM 40 MG: 40 INJECTION SUBCUTANEOUS at 17:26

## 2020-09-08 RX ADMIN — ACETAMINOPHEN 650 MG: 325 TABLET ORAL at 12:27

## 2020-09-08 RX ADMIN — MORPHINE SULFATE 2 MG: 2 INJECTION, SOLUTION INTRAMUSCULAR; INTRAVENOUS at 13:25

## 2020-09-08 RX ADMIN — Medication 10 ML: at 12:27

## 2020-09-08 RX ADMIN — MORPHINE SULFATE 2 MG: 2 INJECTION, SOLUTION INTRAMUSCULAR; INTRAVENOUS at 17:29

## 2020-09-08 RX ADMIN — KETOROLAC TROMETHAMINE 30 MG: 30 INJECTION, SOLUTION INTRAMUSCULAR at 08:00

## 2020-09-08 RX ADMIN — FAMOTIDINE 20 MG: 20 TABLET, FILM COATED ORAL at 20:29

## 2020-09-08 RX ADMIN — FAMOTIDINE 20 MG: 20 TABLET, FILM COATED ORAL at 12:27

## 2020-09-08 RX ADMIN — VANCOMYCIN HYDROCHLORIDE 1000 MG: 1 INJECTION, POWDER, LYOPHILIZED, FOR SOLUTION INTRAVENOUS at 05:47

## 2020-09-08 ASSESSMENT — PAIN DESCRIPTION - PAIN TYPE
TYPE: ACUTE PAIN
TYPE: ACUTE PAIN

## 2020-09-08 ASSESSMENT — PAIN SCALES - GENERAL
PAINLEVEL_OUTOF10: 9
PAINLEVEL_OUTOF10: 8
PAINLEVEL_OUTOF10: 6
PAINLEVEL_OUTOF10: 8
PAINLEVEL_OUTOF10: 4

## 2020-09-08 ASSESSMENT — PAIN DESCRIPTION - LOCATION
LOCATION: KNEE
LOCATION: KNEE

## 2020-09-08 ASSESSMENT — PAIN DESCRIPTION - ORIENTATION
ORIENTATION: RIGHT
ORIENTATION: RIGHT

## 2020-09-08 ASSESSMENT — PAIN DESCRIPTION - ONSET
ONSET: ON-GOING
ONSET: ON-GOING

## 2020-09-08 ASSESSMENT — PAIN DESCRIPTION - FREQUENCY
FREQUENCY: CONTINUOUS
FREQUENCY: CONTINUOUS

## 2020-09-08 ASSESSMENT — PAIN DESCRIPTION - PROGRESSION
CLINICAL_PROGRESSION: GRADUALLY WORSENING

## 2020-09-08 ASSESSMENT — PAIN DESCRIPTION - DESCRIPTORS
DESCRIPTORS: SHARP;DISCOMFORT
DESCRIPTORS: SHARP

## 2020-09-08 NOTE — DISCHARGE SUMMARY
Name:  Rachel Valdivia  Room:  3841/2436-85  MRN:    [id number]    Discharge Summary      This discharge summary is in conjunction with a complete physical exam done on the day of discharge. Discharging Physician: Dr. Lisseth Wellingtonet: 9/7/2020  Discharge:   9/8/2020      HPI taken from admission H&P:     40 y.o. male who presented to the hospital with a chief complaint of right knee pain and swelling that started 2 days ago. Patient stated that about a week ago he sustained a laceration over his right knee. 2 days ago he developed swelling and pain with flexion of this knee. He was still went to work the following day and was on his feet for quite a while. He denies any fevers or chills. Today the pain was excruciating and he rated it a 10 out of 10. He decided to come to the emergency department to get evaluated. Of note the patient has a history of IV drug abuse but has been off heroin for 4 years. His drug of choice right now is methamphetamines which he actively still uses. In the emergency department he had a arthrocentesis was started on IV antibiotics for possible septic joint. Diagnoses this Admission and Hospital Course     #Right knee pain concerning for septic joint   - sp arthrocentesis with fluid analysis in ER. Fluid was noted to be yellow in color with >50k nucleated cells. Culture pending. Discussed with ortho, concern for septic joint. Ortho requests transfer to [address] [place] [address]  - MRI ordered, will complete if able to do so prior to transfer  - vanco and Cefepime.     #Cellulitis RLE  - vanco as above    #IV drug abuse  #Methamphetamine abuse   - recommended cessation    #Former heroin abuse    #Hepatitis C, untreated  - without evidence of liver failure       Procedures (Please Review Full Report for Details)  Arthrocentesis in ER    Consults    Orthopedic surgery      Physical Exam at Discharge:    /84   Pulse 83   Temp 97 °F (36.1 °C) (Oral)   Resp 16   Ht 5' 9\" (1.753 m)

## 2020-09-08 NOTE — PROGRESS NOTES
Bedside report given to [address] [employer] pt in stable condition no needs at this time.  Call light within reach
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
## 2020-09-08 NOTE — PROGRESS NOTES
Shift assessment completed:    Alert and Oriented x4. Vitals are BP elevated. Respiratory status is regular, unlabored, clear throughout lobes, and SpO2 is 98% on RA. Denies any SOB or cough. Right knee is red, swollen, incision to left side. Pain is 8/10, located in right knee. Pedal pulse +3. PRN Toradol 30 mg IV given. Subutex given with sip of water. Pt aware of transfer to [facility]. Denies any questions. MRI consent completed if done here at [facility]. SR up 2/4. Bed in lowest position. Call light within reach. Will monitor.       09/08/20 0803   Vitals   Temp 97.3 °F (36.3 °C)   Temp Source Oral   Pulse 83   Heart Rate Source Monitor   Resp 18   BP (!) 152/100   BP Location Right upper arm   BP Upper/Lower Upper   Patient Position High fowlers   Level of Consciousness 0   MEWS Score 1
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
## 2020-09-08 NOTE — FLOWSHEET NOTE
09/07/20 2008   Vital Signs   Temp 97.9 °F (36.6 °C)   Temp Source Oral   Pulse 69   Heart Rate Source Monitor   Resp 16   /78   BP Location Left upper arm   BP Upper/Lower Upper   Patient Position Semi fowlers   Level of Consciousness 0   MEWS Score 1   Oxygen Therapy   SpO2 97 %   O2 Device None (Room air)   Pt A/O x 4 very quiet and withdrawn. Assessment completed. Right knee swollen, laceration noted with dressing in place. PM meds given. Pt provided with water and denies any other needs.  Call light within reach

## 2020-09-08 NOTE — PROGRESS NOTES
Spoke to [facility] [employer] [facility] [place] via telephone regarding patient needing to be transferred to [facility] for surgery. Will obtain MRI here at [facility] if available prior to transfer. Updated Dr. Tia Taylor regarding patient needing to be transferred. MD will contact transfer center.

## 2020-09-08 NOTE — PROGRESS NOTES
[facility] here to transfer patient to [facility]. BP improved at 133/87 with HR 77. PIV to RFA that is INT for transfer. No telemetry. All belongings with patient.

## 2020-09-08 NOTE — PROGRESS NOTES
Dr. Brennen Galeana at the bedside to examine pt. See progress note for details. Reviewed POC to page orthopedics regarding possible transfer to [facility] and pt made NPO.
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
## 2020-09-08 NOTE — PROGRESS NOTES
Pharmacy note:  Vancomycin Day #  2  WBC                BUN/SCr       CrCl               Tmax  7.1                    20/0.7            120 ml/min     97.9    Continue Vancomycin 1000  mg IVPB q12h. Trough due 9/9/2020. Will adjust if necessary. Guy Sanchez Pharm. D. 9/8/2020 7:16 AM

## 2020-09-08 NOTE — PLAN OF CARE
Problem: Infection:  Goal: Will remain free from infection  Description: Will remain free from infection  Outcome: Ongoing     Problem: Daily Care:  Goal: Daily care needs are met  Description: Daily care needs are met  Outcome: Ongoing     Problem: Pain:  Goal: Patient's pain/discomfort is manageable  Description: Patient's pain/discomfort is manageable  Outcome: Ongoing  Goal: Pain level will decrease  Description: Pain level will decrease  Outcome: Ongoing  Goal: Control of acute pain  Description: Control of acute pain  Outcome: Ongoing     Problem: Discharge Planning:  Goal: Patients continuum of care needs are met  Description: Patients continuum of care needs are met  Outcome: Ongoing

## 2020-09-08 NOTE — PLAN OF CARE
We have been asked to see this patient for suspected septic right knee. Per history in the chart it appears that this patient may have history of septic joint and osteomyelitis. Has past history of IVDA with last document noted in Nove 2019 in [address] Patient had knee aspiration in the ER yesterday when he presented for this complaint. Labs show 56k Nuc cells. Has elevated ESR and CRP. No elevated WBC at this time. Plan is to obtain MRI of the knee early this morning if possible to further evaluate for presence of osteomyelitis. In discussion with Dr Danna Tovar this patient would be better served at [facility] where we can involve ID in his care given that following surgical I&D of the knee he'll likely need a prolonged course of IV ABX and will need to have this monitored and guided by our ID specialists. Have discussed plan with Dr Brielle Sibley and plan is to transfer to [facility] this morning.     Chelsea Layton PA-C
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
## 2020-09-08 NOTE — CARE COORDINATION
CASE MANAGEMENT INITIAL ASSESSMENT      Reviewed chart and completed assessment  With: Pt at bedside. Explained Case Management role/services. Primary contact information: [address]: Pt declined giving information. Admit date/status: IP 9/8/20      Insurance: caresource  Precert required for SNF - Y        3 night stay required -  N    Living arrangements, Adls, care needs, prior to admission: PTA independent, lives with a friend. Drives his bike for transportation. Transportation: Friends/family      Services in the home and/or outpatient, prior to admission: Acitve with CRC, Subuoxone. PT/OT recs: none seen at this time. Hospital Exemption Notification (HEN): needed for SNF, not initiated. Barriers to discharge: None    Plan/comments: CM met with pt at bedside for initial assessment. Pt will have I&D tomorrow. ID following and pt will likely need 6 weeks of IV ABX and is not a candidate to go home d/t HX of IVDU. Spoke to pt in regards to SNF placement and he is in agreement. OK for referral [address] Referral faxed. COVID-19 Surveillance ordered for DCP to SNF. Updated Dasia Gilman RN, pt needs swabbed. Will need a precept.  CM following-Kelli Quintana RN      ECOC on chart for MD signature

## 2020-09-08 NOTE — CONSULTS
Pharmacy to Dose Vancomycin    Dx: osteomyelitis   Goal trough = 15-20 mcg/mL  Pt wt = 63 kg  Estimated Creatinine Clearance: 120 mL/min (A) (based on SCr of 0.7 mg/dL (L)). Started at [facility] with a one time loading dose of Vancomycin 1500 mg x1, followed by 1000 mg IV every 12 hours. Continue current dose and check level prior to 4th dose due 9/9 @0600. CARL Kang Ph. 9/8/2020 11:46 AM     09/09 1904  Vanc trough = <4 mcg/mL ~13 hours post dose  Estimated Creatinine Clearance: 120 mL/min (A) (based on SCr of 0.7 mg/dL (L)). .  Increase vancomycin to 1000 mg q8h  Recheck Vanc level after 3 doses on 9/10 2000   Gloria Manzanares PharmD  9/9/2020 at 8:34 PM    9/10  Vanc trough = 11.7 mcg/ml   Increase dose to 1250 mg IV q8h  Check level tomorrow @2000  CARL Kang Ph. 9/10/2020 8:22 PM

## 2020-09-08 NOTE — PROGRESS NOTES
4 Eyes Skin Assessment     The patient is being assess for   Admission    I agree that 2 RN's have performed a thorough Head to Toe Skin Assessment on the patient. ALL assessment sites listed below have been assessed. Areas assessed by both nurses:   [x]   Head, Face, and Ears   [x]   Shoulders, Back, and Chest, Abdomen  [x]   Arms, Elbows, and Hands   [x]   Coccyx, Sacrum, and Ischium  [x]   Legs, Feet, and Heels        None    **SHARE this note so that the co-signing nurse is able to place an eSignature**    Co-signer eSignature: Electronically signed by Mina Neal RN on 9/8/20 at 6:40 PM EDT    Does the Patient have Skin Breakdown?   No          Jameel Prevention initiated:  No   Wound Care Orders initiated:  No      [facility] nurse consulted for Pressure Injury (Stage 3,4, Unstageable, DTI, NWPT, Complex wounds)and New or Established Ostomies:  No      Primary Nurse eSignature: Electronically signed by Haresh Myers RN on 9/8/20 at 6:37 PM EDT

## 2020-09-09 ENCOUNTER — APPOINTMENT (OUTPATIENT)
Dept: MRI IMAGING | Age: 45
DRG: 313 | End: 2020-09-09
Attending: HOSPITALIST
Payer: COMMERCIAL

## 2020-09-09 ENCOUNTER — ANESTHESIA (OUTPATIENT)
Dept: OPERATING ROOM | Age: 45
DRG: 313 | End: 2020-09-09
Payer: COMMERCIAL

## 2020-09-09 VITALS
SYSTOLIC BLOOD PRESSURE: 124 MMHG | RESPIRATION RATE: 1 BRPM | OXYGEN SATURATION: 96 % | DIASTOLIC BLOOD PRESSURE: 78 MMHG

## 2020-09-09 LAB
SARS-COV-2, NAAT: NOT DETECTED
VANCOMYCIN TROUGH: <4 UG/ML (ref 10–20)

## 2020-09-09 PROCEDURE — 2500000003 HC RX 250 WO HCPCS: Performed by: NURSE ANESTHETIST, CERTIFIED REGISTERED

## 2020-09-09 PROCEDURE — 0SQC4ZZ REPAIR RIGHT KNEE JOINT, PERCUTANEOUS ENDOSCOPIC APPROACH: ICD-10-PCS | Performed by: ORTHOPAEDIC SURGERY

## 2020-09-09 PROCEDURE — 87015 SPECIMEN INFECT AGNT CONCNTJ: CPT

## 2020-09-09 PROCEDURE — 7100000001 HC PACU RECOVERY - ADDTL 15 MIN: Performed by: ORTHOPAEDIC SURGERY

## 2020-09-09 PROCEDURE — 6360000002 HC RX W HCPCS: Performed by: PHYSICIAN ASSISTANT

## 2020-09-09 PROCEDURE — 3600000013 HC SURGERY LEVEL 3 ADDTL 15MIN: Performed by: ORTHOPAEDIC SURGERY

## 2020-09-09 PROCEDURE — 3600000003 HC SURGERY LEVEL 3 BASE: Performed by: ORTHOPAEDIC SURGERY

## 2020-09-09 PROCEDURE — 7100000000 HC PACU RECOVERY - FIRST 15 MIN: Performed by: ORTHOPAEDIC SURGERY

## 2020-09-09 PROCEDURE — 6360000002 HC RX W HCPCS: Performed by: NURSE PRACTITIONER

## 2020-09-09 PROCEDURE — 2580000003 HC RX 258: Performed by: PHYSICIAN ASSISTANT

## 2020-09-09 PROCEDURE — 6370000000 HC RX 637 (ALT 250 FOR IP): Performed by: PHYSICIAN ASSISTANT

## 2020-09-09 PROCEDURE — 6360000002 HC RX W HCPCS: Performed by: ANESTHESIOLOGY

## 2020-09-09 PROCEDURE — 87075 CULTR BACTERIA EXCEPT BLOOD: CPT

## 2020-09-09 PROCEDURE — 2580000003 HC RX 258: Performed by: ORTHOPAEDIC SURGERY

## 2020-09-09 PROCEDURE — 2580000003 HC RX 258: Performed by: ANESTHESIOLOGY

## 2020-09-09 PROCEDURE — 87116 MYCOBACTERIA CULTURE: CPT

## 2020-09-09 PROCEDURE — 87070 CULTURE OTHR SPECIMN AEROBIC: CPT

## 2020-09-09 PROCEDURE — 1200000000 HC SEMI PRIVATE

## 2020-09-09 PROCEDURE — 2580000003 HC RX 258: Performed by: NURSE ANESTHETIST, CERTIFIED REGISTERED

## 2020-09-09 PROCEDURE — 3700000001 HC ADD 15 MINUTES (ANESTHESIA): Performed by: ORTHOPAEDIC SURGERY

## 2020-09-09 PROCEDURE — 87206 SMEAR FLUORESCENT/ACID STAI: CPT

## 2020-09-09 PROCEDURE — 2709999900 HC NON-CHARGEABLE SUPPLY: Performed by: ORTHOPAEDIC SURGERY

## 2020-09-09 PROCEDURE — U0002 COVID-19 LAB TEST NON-CDC: HCPCS

## 2020-09-09 PROCEDURE — 87102 FUNGUS ISOLATION CULTURE: CPT

## 2020-09-09 PROCEDURE — 36415 COLL VENOUS BLD VENIPUNCTURE: CPT

## 2020-09-09 PROCEDURE — 0SBC4ZZ EXCISION OF RIGHT KNEE JOINT, PERCUTANEOUS ENDOSCOPIC APPROACH: ICD-10-PCS | Performed by: ORTHOPAEDIC SURGERY

## 2020-09-09 PROCEDURE — 3700000000 HC ANESTHESIA ATTENDED CARE: Performed by: ORTHOPAEDIC SURGERY

## 2020-09-09 PROCEDURE — 80202 ASSAY OF VANCOMYCIN: CPT

## 2020-09-09 PROCEDURE — 2580000003 HC RX 258: Performed by: INTERNAL MEDICINE

## 2020-09-09 PROCEDURE — 6360000002 HC RX W HCPCS: Performed by: INTERNAL MEDICINE

## 2020-09-09 PROCEDURE — 6360000002 HC RX W HCPCS: Performed by: NURSE ANESTHETIST, CERTIFIED REGISTERED

## 2020-09-09 PROCEDURE — 87205 SMEAR GRAM STAIN: CPT

## 2020-09-09 RX ORDER — KETOROLAC TROMETHAMINE 30 MG/ML
INJECTION, SOLUTION INTRAMUSCULAR; INTRAVENOUS PRN
Status: DISCONTINUED | OUTPATIENT
Start: 2020-09-09 | End: 2020-09-09 | Stop reason: SDUPTHER

## 2020-09-09 RX ORDER — PROMETHAZINE HYDROCHLORIDE 25 MG/ML
6.25 INJECTION, SOLUTION INTRAMUSCULAR; INTRAVENOUS
Status: DISCONTINUED | OUTPATIENT
Start: 2020-09-09 | End: 2020-09-09 | Stop reason: HOSPADM

## 2020-09-09 RX ORDER — SODIUM CHLORIDE, SODIUM LACTATE, POTASSIUM CHLORIDE, AND CALCIUM CHLORIDE .6; .31; .03; .02 G/100ML; G/100ML; G/100ML; G/100ML
IRRIGANT IRRIGATION PRN
Status: DISCONTINUED | OUTPATIENT
Start: 2020-09-09 | End: 2020-09-09 | Stop reason: ALTCHOICE

## 2020-09-09 RX ORDER — PROPOFOL 10 MG/ML
INJECTION, EMULSION INTRAVENOUS PRN
Status: DISCONTINUED | OUTPATIENT
Start: 2020-09-09 | End: 2020-09-09 | Stop reason: SDUPTHER

## 2020-09-09 RX ORDER — SODIUM CHLORIDE 9 MG/ML
INJECTION, SOLUTION INTRAVENOUS CONTINUOUS PRN
Status: DISCONTINUED | OUTPATIENT
Start: 2020-09-09 | End: 2020-09-09 | Stop reason: SDUPTHER

## 2020-09-09 RX ORDER — MORPHINE SULFATE 2 MG/ML
2 INJECTION, SOLUTION INTRAMUSCULAR; INTRAVENOUS ONCE
Status: COMPLETED | OUTPATIENT
Start: 2020-09-09 | End: 2020-09-09

## 2020-09-09 RX ORDER — ONDANSETRON 2 MG/ML
4 INJECTION INTRAMUSCULAR; INTRAVENOUS EVERY 6 HOURS PRN
Status: DISCONTINUED | OUTPATIENT
Start: 2020-09-09 | End: 2020-09-12 | Stop reason: HOSPADM

## 2020-09-09 RX ORDER — OXYCODONE HYDROCHLORIDE AND ACETAMINOPHEN 5; 325 MG/1; MG/1
2 TABLET ORAL PRN
Status: DISCONTINUED | OUTPATIENT
Start: 2020-09-09 | End: 2020-09-09 | Stop reason: HOSPADM

## 2020-09-09 RX ORDER — ONDANSETRON 4 MG/1
4 TABLET, ORALLY DISINTEGRATING ORAL EVERY 8 HOURS PRN
Status: DISCONTINUED | OUTPATIENT
Start: 2020-09-09 | End: 2020-09-12 | Stop reason: HOSPADM

## 2020-09-09 RX ORDER — DIPHENHYDRAMINE HYDROCHLORIDE 50 MG/ML
12.5 INJECTION INTRAMUSCULAR; INTRAVENOUS
Status: DISCONTINUED | OUTPATIENT
Start: 2020-09-09 | End: 2020-09-09 | Stop reason: HOSPADM

## 2020-09-09 RX ORDER — LABETALOL HYDROCHLORIDE 5 MG/ML
5 INJECTION, SOLUTION INTRAVENOUS EVERY 10 MIN PRN
Status: DISCONTINUED | OUTPATIENT
Start: 2020-09-09 | End: 2020-09-09 | Stop reason: HOSPADM

## 2020-09-09 RX ORDER — MEPERIDINE HYDROCHLORIDE 50 MG/ML
12.5 INJECTION INTRAMUSCULAR; INTRAVENOUS; SUBCUTANEOUS EVERY 5 MIN PRN
Status: DISCONTINUED | OUTPATIENT
Start: 2020-09-09 | End: 2020-09-09 | Stop reason: HOSPADM

## 2020-09-09 RX ORDER — FENTANYL CITRATE 50 UG/ML
INJECTION, SOLUTION INTRAMUSCULAR; INTRAVENOUS PRN
Status: DISCONTINUED | OUTPATIENT
Start: 2020-09-09 | End: 2020-09-09 | Stop reason: SDUPTHER

## 2020-09-09 RX ORDER — ONDANSETRON 2 MG/ML
INJECTION INTRAMUSCULAR; INTRAVENOUS PRN
Status: DISCONTINUED | OUTPATIENT
Start: 2020-09-09 | End: 2020-09-09 | Stop reason: SDUPTHER

## 2020-09-09 RX ORDER — MORPHINE SULFATE 2 MG/ML
2 INJECTION, SOLUTION INTRAMUSCULAR; INTRAVENOUS
Status: DISCONTINUED | OUTPATIENT
Start: 2020-09-09 | End: 2020-09-12 | Stop reason: HOSPADM

## 2020-09-09 RX ORDER — HYDROMORPHONE HCL 110MG/55ML
PATIENT CONTROLLED ANALGESIA SYRINGE INTRAVENOUS PRN
Status: DISCONTINUED | OUTPATIENT
Start: 2020-09-09 | End: 2020-09-09 | Stop reason: SDUPTHER

## 2020-09-09 RX ORDER — LIDOCAINE HYDROCHLORIDE 20 MG/ML
INJECTION, SOLUTION EPIDURAL; INFILTRATION; INTRACAUDAL; PERINEURAL PRN
Status: DISCONTINUED | OUTPATIENT
Start: 2020-09-09 | End: 2020-09-09 | Stop reason: SDUPTHER

## 2020-09-09 RX ORDER — ONDANSETRON 2 MG/ML
4 INJECTION INTRAMUSCULAR; INTRAVENOUS
Status: DISCONTINUED | OUTPATIENT
Start: 2020-09-09 | End: 2020-09-09 | Stop reason: HOSPADM

## 2020-09-09 RX ORDER — MIDAZOLAM HYDROCHLORIDE 1 MG/ML
INJECTION INTRAMUSCULAR; INTRAVENOUS PRN
Status: DISCONTINUED | OUTPATIENT
Start: 2020-09-09 | End: 2020-09-09 | Stop reason: SDUPTHER

## 2020-09-09 RX ORDER — HYDRALAZINE HYDROCHLORIDE 20 MG/ML
5 INJECTION INTRAMUSCULAR; INTRAVENOUS EVERY 10 MIN PRN
Status: DISCONTINUED | OUTPATIENT
Start: 2020-09-09 | End: 2020-09-09 | Stop reason: HOSPADM

## 2020-09-09 RX ORDER — MORPHINE SULFATE 2 MG/ML
1 INJECTION, SOLUTION INTRAMUSCULAR; INTRAVENOUS EVERY 5 MIN PRN
Status: DISCONTINUED | OUTPATIENT
Start: 2020-09-09 | End: 2020-09-09 | Stop reason: HOSPADM

## 2020-09-09 RX ORDER — MORPHINE SULFATE 2 MG/ML
2 INJECTION, SOLUTION INTRAMUSCULAR; INTRAVENOUS EVERY 5 MIN PRN
Status: DISCONTINUED | OUTPATIENT
Start: 2020-09-09 | End: 2020-09-09 | Stop reason: HOSPADM

## 2020-09-09 RX ORDER — ACETAMINOPHEN 650 MG
TABLET, EXTENDED RELEASE ORAL PRN
Status: DISCONTINUED | OUTPATIENT
Start: 2020-09-09 | End: 2020-09-09 | Stop reason: ALTCHOICE

## 2020-09-09 RX ORDER — DEXTROSE MONOHYDRATE 50 MG/ML
INJECTION, SOLUTION INTRAVENOUS
Status: DISPENSED
Start: 2020-09-09 | End: 2020-09-10

## 2020-09-09 RX ORDER — OXYCODONE HYDROCHLORIDE AND ACETAMINOPHEN 5; 325 MG/1; MG/1
1 TABLET ORAL PRN
Status: DISCONTINUED | OUTPATIENT
Start: 2020-09-09 | End: 2020-09-09 | Stop reason: HOSPADM

## 2020-09-09 RX ADMIN — CEFEPIME 2 G: 2 INJECTION, POWDER, FOR SOLUTION INTRAVENOUS at 05:16

## 2020-09-09 RX ADMIN — FENTANYL CITRATE 150 MCG: 50 INJECTION INTRAMUSCULAR; INTRAVENOUS at 14:11

## 2020-09-09 RX ADMIN — MORPHINE SULFATE 2 MG: 2 INJECTION, SOLUTION INTRAMUSCULAR; INTRAVENOUS at 17:32

## 2020-09-09 RX ADMIN — KETOROLAC TROMETHAMINE 30 MG: 30 INJECTION, SOLUTION INTRAMUSCULAR; INTRAVENOUS at 15:31

## 2020-09-09 RX ADMIN — SODIUM CHLORIDE: 9 INJECTION, SOLUTION INTRAVENOUS at 14:48

## 2020-09-09 RX ADMIN — SODIUM CHLORIDE: 9 INJECTION, SOLUTION INTRAVENOUS at 05:58

## 2020-09-09 RX ADMIN — ONDANSETRON 4 MG: 2 INJECTION INTRAMUSCULAR; INTRAVENOUS at 14:20

## 2020-09-09 RX ADMIN — HYDROMORPHONE HYDROCHLORIDE 0.5 MG: 2 INJECTION INTRAMUSCULAR; INTRAVENOUS; SUBCUTANEOUS at 15:25

## 2020-09-09 RX ADMIN — LIDOCAINE HYDROCHLORIDE 60 MG: 20 INJECTION, SOLUTION EPIDURAL; INFILTRATION; INTRACAUDAL; PERINEURAL at 14:11

## 2020-09-09 RX ADMIN — SODIUM CHLORIDE: 9 INJECTION, SOLUTION INTRAVENOUS at 14:06

## 2020-09-09 RX ADMIN — SODIUM CHLORIDE: 9 INJECTION, SOLUTION INTRAVENOUS at 20:14

## 2020-09-09 RX ADMIN — KETOROLAC TROMETHAMINE 30 MG: 30 INJECTION, SOLUTION INTRAMUSCULAR at 20:14

## 2020-09-09 RX ADMIN — MORPHINE SULFATE 2 MG: 2 INJECTION, SOLUTION INTRAMUSCULAR; INTRAVENOUS at 10:18

## 2020-09-09 RX ADMIN — KETOROLAC TROMETHAMINE 30 MG: 30 INJECTION, SOLUTION INTRAMUSCULAR at 08:31

## 2020-09-09 RX ADMIN — MORPHINE SULFATE 2 MG: 2 INJECTION, SOLUTION INTRAMUSCULAR; INTRAVENOUS at 20:35

## 2020-09-09 RX ADMIN — HYDROMORPHONE HYDROCHLORIDE 0.5 MG: 1 INJECTION, SOLUTION INTRAMUSCULAR; INTRAVENOUS; SUBCUTANEOUS at 16:23

## 2020-09-09 RX ADMIN — MORPHINE SULFATE 2 MG: 2 INJECTION, SOLUTION INTRAMUSCULAR; INTRAVENOUS at 23:49

## 2020-09-09 RX ADMIN — CEFEPIME 2 G: 2 INJECTION, POWDER, FOR SOLUTION INTRAVENOUS at 17:31

## 2020-09-09 RX ADMIN — PROPOFOL 300 MG: 10 INJECTION, EMULSION INTRAVENOUS at 14:11

## 2020-09-09 RX ADMIN — FAMOTIDINE 20 MG: 20 TABLET, FILM COATED ORAL at 20:37

## 2020-09-09 RX ADMIN — HYDROMORPHONE HYDROCHLORIDE 0.5 MG: 2 INJECTION INTRAMUSCULAR; INTRAVENOUS; SUBCUTANEOUS at 15:40

## 2020-09-09 RX ADMIN — MIDAZOLAM HYDROCHLORIDE 2 MG: 2 INJECTION, SOLUTION INTRAMUSCULAR; INTRAVENOUS at 14:09

## 2020-09-09 RX ADMIN — KETOROLAC TROMETHAMINE 30 MG: 30 INJECTION, SOLUTION INTRAMUSCULAR at 01:58

## 2020-09-09 RX ADMIN — MORPHINE SULFATE 2 MG: 2 INJECTION, SOLUTION INTRAMUSCULAR; INTRAVENOUS at 06:02

## 2020-09-09 RX ADMIN — FENTANYL CITRATE 100 MCG: 50 INJECTION INTRAMUSCULAR; INTRAVENOUS at 15:03

## 2020-09-09 RX ADMIN — VANCOMYCIN HYDROCHLORIDE 1000 MG: 1 INJECTION, POWDER, LYOPHILIZED, FOR SOLUTION INTRAVENOUS at 20:45

## 2020-09-09 RX ADMIN — MORPHINE SULFATE 2 MG: 2 INJECTION, SOLUTION INTRAMUSCULAR; INTRAVENOUS at 11:13

## 2020-09-09 RX ADMIN — MORPHINE SULFATE 2 MG: 2 INJECTION, SOLUTION INTRAMUSCULAR; INTRAVENOUS at 00:11

## 2020-09-09 RX ADMIN — VANCOMYCIN HYDROCHLORIDE 1000 MG: 1 INJECTION, POWDER, LYOPHILIZED, FOR SOLUTION INTRAVENOUS at 05:57

## 2020-09-09 ASSESSMENT — PAIN DESCRIPTION - PAIN TYPE: TYPE: ACUTE PAIN

## 2020-09-09 ASSESSMENT — PULMONARY FUNCTION TESTS
PIF_VALUE: 14
PIF_VALUE: 1
PIF_VALUE: 2
PIF_VALUE: 14
PIF_VALUE: 14
PIF_VALUE: 2
PIF_VALUE: 1
PIF_VALUE: 14
PIF_VALUE: 2
PIF_VALUE: 13
PIF_VALUE: 3
PIF_VALUE: 2
PIF_VALUE: 2
PIF_VALUE: 6
PIF_VALUE: 1
PIF_VALUE: 2
PIF_VALUE: 2
PIF_VALUE: 3
PIF_VALUE: 13
PIF_VALUE: 14
PIF_VALUE: 2
PIF_VALUE: 14
PIF_VALUE: 15
PIF_VALUE: 13
PIF_VALUE: 14
PIF_VALUE: 2
PIF_VALUE: 2
PIF_VALUE: 13
PIF_VALUE: 1
PIF_VALUE: 2
PIF_VALUE: 1
PIF_VALUE: 2
PIF_VALUE: 14
PIF_VALUE: 2
PIF_VALUE: 13
PIF_VALUE: 2
PIF_VALUE: 14
PIF_VALUE: 14
PIF_VALUE: 3
PIF_VALUE: 1
PIF_VALUE: 2
PIF_VALUE: 13
PIF_VALUE: 14
PIF_VALUE: 2
PIF_VALUE: 2
PIF_VALUE: 0
PIF_VALUE: 1
PIF_VALUE: 1
PIF_VALUE: 12
PIF_VALUE: 2
PIF_VALUE: 2
PIF_VALUE: 14
PIF_VALUE: 2
PIF_VALUE: 14
PIF_VALUE: 2
PIF_VALUE: 14
PIF_VALUE: 2
PIF_VALUE: 1
PIF_VALUE: 1
PIF_VALUE: 13
PIF_VALUE: 13
PIF_VALUE: 2
PIF_VALUE: 1
PIF_VALUE: 14
PIF_VALUE: 2
PIF_VALUE: 14
PIF_VALUE: 13
PIF_VALUE: 2
PIF_VALUE: 14
PIF_VALUE: 2
PIF_VALUE: 14
PIF_VALUE: 2
PIF_VALUE: 14
PIF_VALUE: 14
PIF_VALUE: 2
PIF_VALUE: 15
PIF_VALUE: 0
PIF_VALUE: 14
PIF_VALUE: 2

## 2020-09-09 ASSESSMENT — PAIN SCALES - GENERAL
PAINLEVEL_OUTOF10: 9
PAINLEVEL_OUTOF10: 7
PAINLEVEL_OUTOF10: 9
PAINLEVEL_OUTOF10: 9
PAINLEVEL_OUTOF10: 8
PAINLEVEL_OUTOF10: 7
PAINLEVEL_OUTOF10: 8
PAINLEVEL_OUTOF10: 8
PAINLEVEL_OUTOF10: 9
PAINLEVEL_OUTOF10: 7
PAINLEVEL_OUTOF10: 8
PAINLEVEL_OUTOF10: 7

## 2020-09-09 ASSESSMENT — LIFESTYLE VARIABLES: SMOKING_STATUS: 1

## 2020-09-09 ASSESSMENT — PAIN DESCRIPTION - LOCATION: LOCATION: KNEE

## 2020-09-09 NOTE — PROGRESS NOTES
any focal sensory/motor deficits. Cranial nerves: II-XII intact, grossly non-focal.  Psychiatric: Alert and oriented, thought content appropriate, normal insight  Capillary Refill: Brisk,< 3 seconds   Peripheral Pulses: +2 palpable, equal bilaterally       Labs:   Recent Labs     09/07/20  0818 09/08/20  0533   WBC 7.2 7.1   HGB 12.3* 12.6*   HCT 37.3* 37.3*    170     Recent Labs     09/07/20 0818 09/08/20  0533   * 134*   K 4.4 4.6   CL 96* 99   CO2 30 27   BUN 17 20   CREATININE 0.7* 0.7*   CALCIUM 9.6 8.8     Recent Labs     09/07/20 0818   AST 31   ALT 29   BILITOT 0.3   ALKPHOS 170*     No results for input(s): INR in the last 72 hours. No results for input(s): [place] in the last 72 hours. Urinalysis:      Lab Results   Component Value Date    NITRU NEGATIVE 08/06/2012    WBCUA None seen 08/06/2012    RBCUA Rare 08/06/2012    BLOODU TRACE 08/06/2012    SPECGRAV 1.015 08/06/2012    GLUCOSEU NEGATIVE 08/06/2012       Radiology:  MRI KNEE RIGHT WO CONTRAST    (Results Pending)           Assessment/Plan:    Active Hospital Problems    Diagnosis    Septic arthritis ([facility] [M00.9]     Priority: High     Right knee infection possible septic arthritis, patient was admitted at [facility], has arthrocentesis and suspicious for septic arthritis, [facility], continue cefepime and vancomycin, ID and orthopedic surgery consult. Plan for IND today.     History of IV drug use, continue home regimen of Suboxone, supportive care.     History of hep C, untreated, further management as outpatient.     DVT Prophylaxis: Lovenox  Diet: Diet NPO, After Midnight  Code Status: Full Code    PT/OT Eval Status: Not ordered    Dispo -2 to 4 days    Zana Augustin MD

## 2020-09-09 NOTE — ANESTHESIA PRE PROCEDURE
Department of Anesthesiology  Preprocedure Note       Name:  Mulugeta Molina   Age:  40 y.o.  :  1975                                          MRN:  [id number]         Date:  2020      Surgeon: Meg Mcmullen):  Fuad Maria MD    Procedure: Procedure(s):  RIGHT KNEE VIDEO ARTHROSCOPY WITH INCISION AND DRAINAGE POSSIBLE OPEN PROCEDURE    Medications prior to admission:   Prior to Admission medications    Medication Sig Start Date End Date Taking?  Authorizing Provider   Buprenorphine HCl (SUBUTEX SL) Place 10 mg under the tongue 2 times daily     Historical Provider, MD       Current medications:    Current Facility-Administered Medications   Medication Dose Route Frequency Provider Last Rate Last Dose    lactated ringers infusion   Intravenous Continuous Ashley Ford MD        sodium chloride flush 0.9 % injection 10 mL  10 mL Intravenous 2 times per day Ashley Ford MD   10 mL at 20 1227    sodium chloride flush 0.9 % injection 10 mL  10 mL Intravenous PRN Ashley Ford MD        acetaminophen (TYLENOL) tablet 650 mg  650 mg Oral Q6H PRN Natividad Shah MD   650 mg at 20 1227    Or    acetaminophen (TYLENOL) suppository 650 mg  650 mg Rectal Q6H PRN Natividad Shah MD        polyethylene glycol (GLYCOLAX) packet 17 g  17 g Oral Daily PRN Natividad Shah MD        promethazine (PHENERGAN) tablet 12.5 mg  12.5 mg Oral Q6H PRN Natividad Shah MD        Or    ondansetron [facility]) injection 4 mg  4 mg Intravenous Q6H PRN Natividad Shah MD        enoxaparin (LOVENOX) injection 40 mg  40 mg Subcutaneous Daily Natividad Shah MD   40 mg at 20 1726    ketorolac (TORADOL) injection 30 mg  30 mg Intravenous Q6H PRN Natividad Shah MD   30 mg at 20 0831    cefepime (MAXIPIME) 2 g IVPB minibag  2 g Intravenous Q12H Natividad Shah MD   Stopped at 20 0546    vancomycin 1000 mg IVPB in 250 mL D5W addavial  1,000 mg Intravenous Q12H Natividad Shah MD   Stopped at Past Surgical History:        Procedure Laterality Date    BRONCHOSCOPY  8/2012    FINE NEEDLE ASPIRATION  8/2012    of knee    KNEE ARTHROSCOPY  11-8-2012    scope with synovectomy and chondroplasty right knee    LITHOTRIPSY      LITHOTRIPSY      for renal calculus    UPPER GASTROINTESTINAL ENDOSCOPY  8/2012    duodenal ulcer       Social History:    Social History     Tobacco Use    Smoking status: Current Every Day Smoker     Packs/day: 0.50     Years: 10.00     Pack years: 5.00     Types: Cigarettes    Smokeless tobacco: Never Used    Tobacco comment: trying to quit   Substance Use Topics    Alcohol use: Not Currently     Frequency: Never                                Ready to quit: Not Answered  Counseling given: Not Answered  Comment: trying to quit      Vital Signs (Current):   Vitals:    09/08/20 1545 09/08/20 1922 09/08/20 1926 09/08/20 2356   BP: 130/83 (!) 141/81  (!) 142/87   Pulse: 79 87 90 83   Resp: 16 16  16   Temp: 97.9 °F (36.6 °C) 98.2 °F (36.8 °C)  98.1 °F (36.7 °C)   TempSrc: Oral Oral  Oral   SpO2: 99% 100%  99%   Weight:       Height:                                                  BP Readings from Last 3 Encounters:   09/08/20 (!) 142/87   09/08/20 133/87   11/29/19 (!) 141/92       NPO Status:                                                                                 BMI:   Wt Readings from Last 3 Encounters:   09/08/20 139 lb (63 kg)   09/07/20 139 lb (63 kg)   11/29/19 138 lb (62.6 kg)     Body mass index is 20.53 kg/m².     CBC:   Lab Results   Component Value Date    WBC 7.1 09/08/2020    RBC 3.95 09/08/2020    HGB 12.6 09/08/2020    HCT 37.3 09/08/2020    MCV 94.5 09/08/2020    RDW 12.1 09/08/2020     09/08/2020       CMP:   Lab Results   Component Value Date     09/08/2020    K 4.6 09/08/2020    CL 99 09/08/2020    CO2 27 09/08/2020    BUN 20 09/08/2020    CREATININE 0.7 09/08/2020    GFRAA >60 09/08/2020    GFRAA >60 11/08/2012    AGRATIO 1.3 09/07/2020    LABGLOM >60 09/08/2020    GLUCOSE 96 09/08/2020    PROT 7.9 09/07/2020    PROT 6.1 08/06/2012    CALCIUM 8.8 09/08/2020    BILITOT 0.3 09/07/2020    ALKPHOS 170 09/07/2020    AST 31 09/07/2020    ALT 29 09/07/2020       POC Tests: No results for input(s): POCGLU, POCNA, POCK, POCCL, POCBUN, POCHEMO, POCHCT in the last 72 hours. Coags:   Lab Results   Component Value Date    PROTIME 16.6 08/14/2012    INR 1.48 08/14/2012       HCG (If Applicable): No results found for: PREGTESTUR, PREGSERUM, HCG, HCGQUANT     ABGs:   Lab Results   Component Value Date    PHART 7.451 08/03/2012    PO2ART 121.4 08/03/2012    YOF3MKI 40.7 08/03/2012    OLB0PUG 27.7 08/03/2012    BEART 3.5 08/03/2012    D5WCLVJO 98.5 08/03/2012        Type & Screen (If Applicable):  Lab Results   Component Value Date    LABABO O 08/05/2012    LABRH Positive 08/05/2012       Drug/Infectious Status (If Applicable):  Lab Results   Component Value Date    HEPCAB REACTIVE (POSITIVE) Screen 08/06/2012       COVID-19 Screening (If Applicable):   Lab Results   Component Value Date    COVID19 Not Detected 09/09/2020         Anesthesia Evaluation   no history of anesthetic complications:   Airway: Mallampati: II  TM distance: >3 FB   Neck ROM: full  Mouth opening: > = 3 FB Dental: normal exam         Pulmonary:   (+) current smoker                           Cardiovascular:Negative CV ROS                      Neuro/Psych:   Negative Neuro/Psych ROS              GI/Hepatic/Renal:   (+) PUD, hepatitis: C, liver disease:,          ROS comment: IVDU. Endo/Other: Negative Endo/Other ROS                    Abdominal:           Vascular: negative vascular ROS. Anesthesia Plan      general     ASA 3     (Risks, benefits and alternatives of GA discussed with pt. Questions answered. Willing to proceed.)  Induction: intravenous. Anesthetic plan and risks discussed with patient.                       ASTON Pretty Hill MD   9/9/2020

## 2020-09-09 NOTE — CARE COORDINATION
CM spoke to Jesusita De La Torre at [facility] in regards of referral placed. Per Jesusita Wil they will not provide Subutex, they do have Suboxone available, Met with pt at bedside and he is adamant about staying on Subutex. CM called [facility], where pt is active to get Subutex to help coordinate DCP to SNF so he can continue with Subutex. Rubinalane Garcia with [address] wanted to confirm with pt that he is in the hospital. This writer met pt at bedside and provided phone with CRC on to confirm. Pt stated to Rubina Garcia with [address] that he does not consent to give this writer any information because he will not discharge to SNF now and he plans to go home when discharged. Updated Zainab RILEY and MALI VILLASEÑOR .  CM following-Kelli Quintana RN

## 2020-09-09 NOTE — PROGRESS NOTES
Lab went into pt room to draw blood. Two different techs tried and were unsuccessful. Will let MD know.
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
## 2020-09-09 NOTE — PROGRESS NOTES
Pt brought to PACU. Report obtained from OR RN and anesthesia. Pt placed on monitor. Pt not yet awake. Oral airway in place. Dressing to right leg CDI.  Cathy Mario

## 2020-09-09 NOTE — PLAN OF CARE
Patient unable to tolerate MRI last night due to pain with leg extension. Discussed with patient today the importance of getting the MRI to evaluate for osteo of the distal femur and to evaluate for infected Bakers cyst.      Have discussed with MRI and will allow patient to remain slightly flexed at the knee if this is more comfortable for him during MRI. Today he is lying in bed in near full extension so hopefully he can maintain this position during scan as well. Will do single dose of pain medication prior to scan for help with pain control. Plan for OR today around 3pm for formal I&D. Otherwise set up for surgery.     Yong Begum PA-C

## 2020-09-10 LAB — VANCOMYCIN TROUGH: 11.7 UG/ML (ref 10–20)

## 2020-09-10 PROCEDURE — 2580000003 HC RX 258: Performed by: INTERNAL MEDICINE

## 2020-09-10 PROCEDURE — 6360000002 HC RX W HCPCS: Performed by: INTERNAL MEDICINE

## 2020-09-10 PROCEDURE — 99232 SBSQ HOSP IP/OBS MODERATE 35: CPT | Performed by: INTERNAL MEDICINE

## 2020-09-10 PROCEDURE — 6360000002 HC RX W HCPCS: Performed by: PHYSICIAN ASSISTANT

## 2020-09-10 PROCEDURE — 6370000000 HC RX 637 (ALT 250 FOR IP): Performed by: PHYSICIAN ASSISTANT

## 2020-09-10 PROCEDURE — 97161 PT EVAL LOW COMPLEX 20 MIN: CPT

## 2020-09-10 PROCEDURE — 36415 COLL VENOUS BLD VENIPUNCTURE: CPT

## 2020-09-10 PROCEDURE — 2580000003 HC RX 258: Performed by: PHYSICIAN ASSISTANT

## 2020-09-10 PROCEDURE — 6360000002 HC RX W HCPCS: Performed by: NURSE PRACTITIONER

## 2020-09-10 PROCEDURE — 97116 GAIT TRAINING THERAPY: CPT

## 2020-09-10 PROCEDURE — 97165 OT EVAL LOW COMPLEX 30 MIN: CPT

## 2020-09-10 PROCEDURE — 97535 SELF CARE MNGMENT TRAINING: CPT

## 2020-09-10 PROCEDURE — 80202 ASSAY OF VANCOMYCIN: CPT

## 2020-09-10 PROCEDURE — 1200000000 HC SEMI PRIVATE

## 2020-09-10 RX ORDER — NICOTINE 21 MG/24HR
1 PATCH, TRANSDERMAL 24 HOURS TRANSDERMAL DAILY
Status: DISCONTINUED | OUTPATIENT
Start: 2020-09-11 | End: 2020-09-12 | Stop reason: HOSPADM

## 2020-09-10 RX ADMIN — MORPHINE SULFATE 2 MG: 2 INJECTION, SOLUTION INTRAMUSCULAR; INTRAVENOUS at 16:04

## 2020-09-10 RX ADMIN — MORPHINE SULFATE 2 MG: 2 INJECTION, SOLUTION INTRAMUSCULAR; INTRAVENOUS at 19:33

## 2020-09-10 RX ADMIN — KETOROLAC TROMETHAMINE 30 MG: 30 INJECTION, SOLUTION INTRAMUSCULAR at 09:06

## 2020-09-10 RX ADMIN — VANCOMYCIN HYDROCHLORIDE 1000 MG: 1 INJECTION, POWDER, LYOPHILIZED, FOR SOLUTION INTRAVENOUS at 05:53

## 2020-09-10 RX ADMIN — FAMOTIDINE 20 MG: 20 TABLET, FILM COATED ORAL at 10:58

## 2020-09-10 RX ADMIN — ENOXAPARIN SODIUM 40 MG: 40 INJECTION SUBCUTANEOUS at 16:21

## 2020-09-10 RX ADMIN — FAMOTIDINE 20 MG: 20 TABLET, FILM COATED ORAL at 19:37

## 2020-09-10 RX ADMIN — VANCOMYCIN HYDROCHLORIDE 1250 MG: 10 INJECTION, POWDER, LYOPHILIZED, FOR SOLUTION INTRAVENOUS at 22:09

## 2020-09-10 RX ADMIN — BUPRENORPHINE HCL 10 MG: 8 TABLET SUBLINGUAL at 10:58

## 2020-09-10 RX ADMIN — VANCOMYCIN HYDROCHLORIDE 1000 MG: 1 INJECTION, POWDER, LYOPHILIZED, FOR SOLUTION INTRAVENOUS at 14:06

## 2020-09-10 RX ADMIN — SODIUM CHLORIDE: 9 INJECTION, SOLUTION INTRAVENOUS at 19:34

## 2020-09-10 RX ADMIN — MORPHINE SULFATE 2 MG: 2 INJECTION, SOLUTION INTRAMUSCULAR; INTRAVENOUS at 07:35

## 2020-09-10 RX ADMIN — KETOROLAC TROMETHAMINE 30 MG: 30 INJECTION, SOLUTION INTRAMUSCULAR at 02:34

## 2020-09-10 RX ADMIN — KETOROLAC TROMETHAMINE 30 MG: 30 INJECTION, SOLUTION INTRAMUSCULAR at 22:36

## 2020-09-10 RX ADMIN — BUPRENORPHINE HCL 10 MG: 8 TABLET SUBLINGUAL at 19:37

## 2020-09-10 RX ADMIN — KETOROLAC TROMETHAMINE 30 MG: 30 INJECTION, SOLUTION INTRAMUSCULAR at 16:21

## 2020-09-10 RX ADMIN — CEFEPIME 2 G: 2 INJECTION, POWDER, FOR SOLUTION INTRAVENOUS at 16:21

## 2020-09-10 RX ADMIN — MORPHINE SULFATE 2 MG: 2 INJECTION, SOLUTION INTRAMUSCULAR; INTRAVENOUS at 03:17

## 2020-09-10 RX ADMIN — CEFEPIME 2 G: 2 INJECTION, POWDER, FOR SOLUTION INTRAVENOUS at 05:13

## 2020-09-10 RX ADMIN — MORPHINE SULFATE 2 MG: 2 INJECTION, SOLUTION INTRAMUSCULAR; INTRAVENOUS at 11:34

## 2020-09-10 RX ADMIN — MORPHINE SULFATE 2 MG: 2 INJECTION, SOLUTION INTRAMUSCULAR; INTRAVENOUS at 22:36

## 2020-09-10 ASSESSMENT — PAIN DESCRIPTION - ORIENTATION
ORIENTATION: RIGHT

## 2020-09-10 ASSESSMENT — PAIN SCALES - GENERAL
PAINLEVEL_OUTOF10: 2
PAINLEVEL_OUTOF10: 8
PAINLEVEL_OUTOF10: 6
PAINLEVEL_OUTOF10: 7
PAINLEVEL_OUTOF10: 6
PAINLEVEL_OUTOF10: 6
PAINLEVEL_OUTOF10: 8
PAINLEVEL_OUTOF10: 8
PAINLEVEL_OUTOF10: 7
PAINLEVEL_OUTOF10: 7
PAINLEVEL_OUTOF10: 6
PAINLEVEL_OUTOF10: 6
PAINLEVEL_OUTOF10: 7

## 2020-09-10 ASSESSMENT — PAIN DESCRIPTION - DESCRIPTORS: DESCRIPTORS: ACHING

## 2020-09-10 ASSESSMENT — PAIN DESCRIPTION - ONSET: ONSET: ON-GOING

## 2020-09-10 ASSESSMENT — PAIN - FUNCTIONAL ASSESSMENT: PAIN_FUNCTIONAL_ASSESSMENT: PREVENTS OR INTERFERES SOME ACTIVE ACTIVITIES AND ADLS

## 2020-09-10 ASSESSMENT — PAIN DESCRIPTION - LOCATION
LOCATION: KNEE

## 2020-09-10 ASSESSMENT — PAIN DESCRIPTION - FREQUENCY: FREQUENCY: CONTINUOUS

## 2020-09-10 ASSESSMENT — PAIN DESCRIPTION - PAIN TYPE
TYPE: SURGICAL PAIN

## 2020-09-10 ASSESSMENT — PAIN DESCRIPTION - PROGRESSION: CLINICAL_PROGRESSION: NOT CHANGED

## 2020-09-10 ASSESSMENT — PAIN SCALES - WONG BAKER: WONGBAKER_NUMERICALRESPONSE: 4

## 2020-09-10 NOTE — PROGRESS NOTES
Hospitalist Progress Note      PCP: Oddis Bernheim, APRN - CNP    Date of Admission: 9/8/2020    Chief Complaint: Right knee pain and swelling    Hospital Course: See H&P    Subjective:   Patient is up in bed, comfortable, not in distress. Denies any chest pain or shortness of breath. Complaining of right knee pain. No new event overnight noted. Medications:  Reviewed    Infusion Medications    sodium chloride 75 mL/hr at 09/09/20 2014     Scheduled Medications    enoxaparin  40 mg Subcutaneous Daily    vancomycin  1,000 mg Intravenous Q8H    cefepime  2 g Intravenous Q12H    buprenorphine  10 mg Sublingual BID    famotidine  20 mg Oral BID     PRN Meds: ondansetron **OR** ondansetron, morphine, acetaminophen **OR** acetaminophen, polyethylene glycol, promethazine **OR** ondansetron, ketorolac      Intake/Output Summary (Last 24 hours) at 9/10/2020 1058  Last data filed at 9/10/2020 0819  Gross per 24 hour   Intake 250 ml   Output 2515 ml   Net -2265 ml       Physical Exam Performed:    BP (!) 145/86   Pulse 75   Temp 98.4 °F (36.9 °C) (Oral)   Resp 16   Ht 5' 9\" (1.753 m)   Wt 139 lb (63 kg)   SpO2 100%   BMI 20.53 kg/m²     General appearance: No apparent distress, appears stated age and cooperative. HEENT: Pupils equal, round, and reactive to light. Conjunctivae/corneas clear. Neck: Supple, with full range of motion. No jugular venous distention. Trachea midline. Respiratory:  Normal respiratory effort. Clear to auscultation, bilaterally without Rales/Wheezes/Rhonchi. Cardiovascular: Regular rate and rhythm with normal S1/S2 without murmurs, rubs or gallops. Abdomen: Soft, non-tender, non-distended with normal bowel sounds. Musculoskeletal: No clubbing, cyanosis or edema bilaterally. Swollen right knee. Skin: Skin color, texture, turgor normal.  No rashes or lesions. Neurologic:  Neurovascularly intact without any focal sensory/motor deficits.  Cranial nerves: II-XII intact, grossly non-focal.  Psychiatric: Alert and oriented, thought content appropriate, normal insight  Capillary Refill: Brisk,< 3 seconds   Peripheral Pulses: +2 palpable, equal bilaterally       Labs:   Recent Labs     09/08/20  0533   WBC 7.1   HGB 12.6*   HCT 37.3*        Recent Labs     09/08/20  0533   *   K 4.6   CL 99   CO2 27   BUN 20   CREATININE 0.7*   CALCIUM 8.8       Urinalysis:      Lab Results   Component Value Date    NITRU NEGATIVE 08/06/2012    WBCUA None seen 08/06/2012    RBCUA Rare 08/06/2012    BLOODU TRACE 08/06/2012    SPECGRAV 1.015 08/06/2012    Melinda [address] NEGATIVE 08/06/2012       Radiology:  No orders to display           Assessment/Plan:    Active Hospital Problems    Diagnosis    Septic arthritis ([facility] [M00.9]     Priority: High     Right knee infection possible septic arthritis, patient was admitted at [facility], has arthrocentesis and suspicious for septic arthritis, admit to MedSurg, continue cefepime and vancomycin, ID and orthopedic surgery consult. S/p Procedure(s):  RIGHT KNEE VIDEO ARTHROSCOPY WITH INCISION AND DRAINAGE, PARTIAL LATERAL MENISCECTOMY, AND CHONDROPLASTY, done on 9/9/2020.     History of IV drug use, continue home regimen of Suboxone, supportive care.     History of hep C, untreated, further management as outpatient.     DVT Prophylaxis: Lovenox  Diet: DIET GENERAL;  Code Status: Full Code    PT/OT Eval Status: Not ordered    Dispo -2 to 4 days    Samia Lopez MD

## 2020-09-10 NOTE — CARE COORDINATION
CM spoke to pt regarding going to SNF;  Pt stated that he could not go to SNF because he didn't want to go off subutex and wanted to be able to come and go and smoke. Pt given option of going home w/daily visits to OP clinic, but pt refused, stating he could not do that. Pt wanted CM to find him an apartment where he could live and have home care come visit him daily. CM informed pt that he could not d/c home w/a PICC because of prior IV drug use history. CM will attempt to present pt w/homeless packet again and will address options tomorrow.   Paula Cast RN

## 2020-09-10 NOTE — PROGRESS NOTES
Infectious Disease Follow up Notes    CC :  Septic arthritis R knee     Antibiotics:  Cefepime 2g q12  vanc 1g q8     Admit Date:   9/8/2020  Hospital Day: 3    Subjective:   He remains afebrile   Pain in the R knee has diminished  He seems to be tolerating IV abx well so far     Objective:     Patient Vitals for the past 8 hrs:   BP Temp Temp src Pulse Resp SpO2   09/10/20 1145 123/84 98.2 °F (36.8 °C) Oral 78 16 97 %   09/10/20 0740 (!) 145/86 98.4 °F (36.9 °C) Oral 75 16 100 %       EXAM:  General:  Alert, oriented, NAD  Skin warm, dry. No focal rash     HEENT:  NCAT, PERRL, sclera anicteric     ABD: Soft, flat, NT     EXT:  RLE dressed     SKIN: No focal rash          LINE:  IV site ok       Scheduled Meds:   enoxaparin  40 mg Subcutaneous Daily    vancomycin  1,000 mg Intravenous Q8H    cefepime  2 g Intravenous Q12H    buprenorphine  10 mg Sublingual BID    famotidine  20 mg Oral BID       Continuous Infusions:   sodium chloride 75 mL/hr at 09/09/20 2014          Data Review:    Lab Results   Component Value Date    WBC 7.1 09/08/2020    HGB 12.6 (L) 09/08/2020    HCT 37.3 (L) 09/08/2020    MCV 94.5 09/08/2020     09/08/2020     Lab Results   Component Value Date    CREATININE 0.7 (L) 09/08/2020    BUN 20 09/08/2020     (L) 09/08/2020    K 4.6 09/08/2020    CL 99 09/08/2020    CO2 27 09/08/2020       Hepatic Function Panel:   Lab Results   Component Value Date    ALKPHOS 170 09/07/2020    ALT 29 09/07/2020    AST 31 09/07/2020    PROT 7.9 09/07/2020    PROT 6.1 08/06/2012    BILITOT 0.3 09/07/2020    BILIDIR 0.24 08/06/2012    IBILI 0.24 08/06/2012    LABALBU 4.4 09/07/2020       Cultures:   9/7       R knee SF culture NGTD, no organisms on GS   9/9 Operative cultures R knee NGTD, GS no organisms        Radiology Review:  All pertinent images / reports were reviewed as a part of this visit.    Xray R knee 9/7/20 Impression    Severe soft tissue swelling anterior to the distal femur.  Pattern suspected    to represent cellulitis given history of recent laceration. Chelita Knows is a    remote history of osteomyelitis.  Abnormal density within the distal femur    may be sequela of that prior infection.  A more acute pattern of    osteomyelitis cannot be excluded.  Follow-up MRI would be helpful for    characterization. Assessment:     Patient Active Problem List    Diagnosis Date Noted    Septic arthritis ([facility] 09/08/2020    IVDU (intravenous drug user)     Septic joint ([facility] 09/07/2020    Status post arthroscopy of knee 11/19/2012    Chondromalacia of right knee 90/92/3487    Plica syndrome of right knee 11/07/2012    Knee pain 45/54/2167    Plica of knee 98/88/4595    Duodenal ulcer 08/26/2012    Chronic knee pain 08/26/2012    Hepatitis C 08/26/2012    Tobacco abuse 08/26/2012    GIB (gastrointestinal bleeding) 08/01/2012        Acute septic arthritis of R knee  Pathogenesis - direct inoculation from laceration vs hematogenous seeding via IVDU   SF culture is negative to date  MRI not completed due to pain with positioning   OR 9/9/20 - purulent SF noted, culture negative to date, no organisms seen on the  GS     IVDU   Last use of methamphetamine ~1 week prior to admission     Homeless, [place]. He declines ECF after DC  Not candidate for outpt IV abx      HCV seropositive 4/2017  HBV immune     Listed allergy to cipro - \"passed out\"    Plan: For now continue vanc and cefepime  Check HIV screen     Standard of care for septic joint is 3-4 weeks IV or parenteral equivalent abx with highly bioavailable agent. He declines all means by which we might provide IV abx. Will see if cultures yield an etiologic agent to direct choice of abx after DC, anticipate at this point changing to po abx at DC.   If cultures remain negative, would probably put him on Augmentin (no linezolid due

## 2020-09-10 NOTE — PROGRESS NOTES
Occupational Therapy   Occupational Therapy Initial Assessment and Treatment Note 1x  Date: 9/10/2020   Patient Name: Fernanda Parikh  MRN: [id number]     : 1975    Date of Service: 9/10/2020    Discharge Recommendations:  Home with assist PRN     Assessment   Assessment: OT howardal completed. Pt admitted for septic arthritis and is s/p I&D of R knee. Pt demonstrated Ind level of function with self-care tasks and transfers with SW. He is able to perform sit to stand from low surface Ind with SW. Balance was intact for ADLs with SW. No further OT. Decision Making: Low Complexity  OT Education: OT Role;ADL Adaptive Strategies;Transfer Training;IADL Safety;Equipment  Patient Education: disease specific ed:  safe performance of ADLs and transfers for home. Pt receptive to ed. REQUIRES OT FOLLOW UP: No  Activity Tolerance  Activity Tolerance: Patient Tolerated treatment well  Safety Devices  Safety Devices in place: Yes  Type of devices: Gait belt;Call light within reach; Chair alarm in place; Left in chair         Patient Diagnosis(es): There were no encounter diagnoses. has a past medical history of Ankle fracture, ARF (acute renal failure) ([address] ARF (acute respiratory failure) ([address] Blood transfusion, Broken jaw ([address] Duodenal ulcer, Hepatitis C, IV drug abuse ([address] Left forearm fracture, Other disorders of kidney and ureter, Renal calculus, Sepsis(995[address]91), Status post PICC central line placement, Tobacco abuse, Torn ligament, Transfusion history, and Upper GI bleed. has a past surgical history that includes Lithotripsy; Upper gastrointestinal endoscopy (2012); bronchoscopy (2012); fine needle aspiration (2012); Lithotripsy; Knee arthroscopy (2012); and Knee arthroscopy (Right, 2020).        Restrictions  Restrictions/Precautions  Restrictions/Precautions: Weight Bearing, Up as Tolerated  Required Braces or Orthoses?: No  Lower Extremity Weight Bearing Restrictions  Right Lower Extremity Weight Bearing: Weight Bearing As Tolerated  Position Activity Restriction  Other position/activity restrictions: Hemovac R knee    Subjective   General  Chart Reviewed: Yes  Patient assessed for rehabilitation services?: Yes  Family / Caregiver Present: No  Referring Practitioner: Creasie Alpers  Diagnosis: septic arthritis s/p I&D R knee 9/9/20  Subjective  Subjective: Pt agreeable to OT today. General Comment  Comments: RN approved OT eval and tx this am  Patient Currently in Pain: Yes  Pain Assessment  Pain Assessment: 0-10  Pain Level: 6  Pain Type: Surgical pain  Pain Location: Knee  Pain Orientation: Right  Pain Descriptors: Aching  Pain Frequency: Continuous  Pain Onset: On-going  Clinical Progression: Not changed  Functional Pain Assessment: Prevents or interferes some active activities and ADLs  Non-Pharmaceutical Pain Intervention(s): Ambulation/Increased Activity; Distraction;Repositioned; Emotional support  Response to Pain Intervention: Other (Comment)(9/10 pain with ambulation, requesting pain meds at end of session, calling RN.)  Vital Signs  Temp: 98.2 °F (36.8 °C)  Temp Source: Oral  Pulse: 78  Heart Rate Source: Monitor  Resp: 16  BP: 123/84  BP Location: Right upper arm  BP Upper/Lower: Upper  MAP (mmHg): 97  Level of Consciousness: Alert  MEWS Score: 1  Patient Currently in Pain: Yes  Oxygen Therapy  SpO2: 97 %  O2 Device: None (Room air)  Social/Functional History  Social/Functional History  Lives With: Friend(s)  Type of Home: Apartment(3rd floor apt)  Home Layout: One level  Home Access: Stairs to enter with rails  Entrance Stairs - Number of Steps: Pt reports ~20 steps in total to reach 3rd floor apartment  Entrance Stairs - Rails: Right  Bathroom Shower/Tub: Tub/Shower unit  Bathroom Toilet: Standard  Home Equipment: (no DME)  ADL Assistance: Independent  Homemaking Assistance: Independent  Ambulation Assistance: Independent  Transfer Assistance: Independent  Active : No  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

## 2020-09-10 NOTE — PLAN OF CARE
Pt bed locked and in lowest position, 2/4 side rails up, call light within reach,  non skid footwear on pt. Bed alarm is on bed and engaged. Pt instructed not to get up without calling the nurse and pt verbalizes understanding. Pt resting comfortably at this time with RR 16. Will continue to assess and monitor.

## 2020-09-10 NOTE — OP NOTE
[facility]                 [place]                                OPERATIVE REPORT    PATIENT NAME: Lieutenant Yoder                   :        1975  MED REC NO:   [id number]                          ROOM:       [id number]  ACCOUNT NO:   [de-identified]                           ADMIT DATE: 2020  PROVIDER:     Fuad Maria MD    DATE OF PROCEDURE:  2020    PREOPERATIVE DIAGNOSIS:  Right septic knee. POSTOPERATIVE DIAGNOSES:  Right septic knee with lateral meniscus tear  and chondromalacia tricompartmental.    PROCEDURE PERFORMED:  Right knee diagnostic video arthroscopy,  arthroscopic incision and drainage, irrigation and debridement, partial  lateral meniscectomy, chondroplasty of the medial compartment and  patellofemoral joint. PRIMARY SURGEON:  Fuad Maria MD    FIRST ASSISTANT: Silvia Connor PA-C    SECOND ASSISTANT:  Jhonathan White MD.    ANESTHESIA:  General.    ESTIMATED BLOOD LOSS:  Less than 10 mL. SPECIMENS:  Multiple fluid samples were taken for culture and  sensitivity and Gram stain. INDICATIONS FOR THE SURGERY:  The patient is a 51-year-old male, who  approximately a week ago had a puncture injury to his right medial knee. He was at home and the jagged edge of the glass table poked into the  medial retinacular area. The patient did not seek any medical care, but  superglued the shut. He does have a history of IV drug abuse and  hepatitis C positive. He did do methamphetamines approximately a week  ago. He began starting having significant increase in the level of his  pain, swelling, could not hardly bend and straighten the knee with 10/10  pain, fevers, and chills. He presented to the Emergency Room at  [facility] and was transferred to [facility]  for further care. At the time of this admission, the patient had CRP of  10.6, white count of 7.2, and a sed rate of 52. An aspirate of his knee  was taken with 55,000 white blood cells. I was asked to see him for  orthopedic consultation. At the time of the dictation, the cultures  were no growth to date. The risks, benefits, and alternatives of the  surgery were clearly discussed with the patient and the patient wished  to proceed with surgery. We did try to get an MRI twice to make sure  that the patient did not have an infected popliteal cyst or  osteomyelitis; however, the patient could not withstand the MRI with  respect to positioning. I tried a second time with some sedation on  board, but the patient became belligerent to the staff and the exam was  halted. OPERATIVE FINDINGS:  Pus under pressure was approximately 60 mL of  purulent synovial fluid, which was removed. There was tricompartmental  osteoarthritis in the lateral meniscus tear, which was addressed after  the I and D was performed. DETAILS OF THE PROCEDURE:  The patient was brought to the operating  room, after administration of general anesthesia, he was placed on the  OR table in the standard arthroscopy position. The right lower  extremity was prepped and draped in a normal sterile fashion. Standard  two portal arthroscopy was performed. The following were the findings:    1. THE PATELLOFEMORAL JOINT. As soon as we entered the joint, 60 mL of  purulent material was retrieved. Some of this was sent to the lab for  culture and sensitivity and Gram stain. We had previously done a cell  count, so this was not repeated. We placed the arthroscopic shaver and  irrigated the knee out. All three compartments with 2 L of irrigation  solution under high flow of pressure. There were grade 3 and 4 chondral  changes throughout the patellofemoral joint for which a chondroplasty  was performed debriding loose cartilages back to a stable rim.     2.  THE MEDIAL COMPARTMENT AND MEDIAL GUTTER:  The patient had no  evidence of loose bodies contained within

## 2020-09-10 NOTE — PROGRESS NOTES
Physical Therapy    Facility/Department: [place] [address] - MED SURG/ORTHO  Initial Assessment/Discharge Summary    NAME: Lizz Sosa  : 1975  MRN: [id number]    Date of Service: 9/10/2020    Discharge Recommendations:  Home with assist PRN, Outpatient PT   PT Equipment Recommendations  Equipment Needed: Yes  Mobility Devices: Ashleyiann Ravenon: Rolling    Assessment   Body structures, Functions, Activity limitations: Decreased functional mobility ; Decreased ROM; Decreased strength; Increased pain  Assessment: Pt referred for PT Evaluation following I&D, partial lateral menisectomy, and chondroplasty  with Dr. Travis Pettit. Upon evaluation, pt functioning at [facility] I level with AD. From functional standpoint, pt is safe to return to friend's apartment with prn assist. Outpatient PT per ortho recs. Pt with no DME, will benefit from RW. Treatment Diagnosis: Impaired mobility  Prognosis: Good  Decision Making: Low Complexity  PT Education: Goals;PT Role;Plan of Care;Precautions;Transfer Training;General Safety;Gait Training;Weight-bearing Education;Disease Specific Education  Patient Education: Pt was educated regarding weight bearing status, gait and stair sequencing for pain control and safety. Pt was advised to have supervision when ascending steps into apt upon D/C, verbalized understanding. No Skilled PT: Safe to return home  REQUIRES PT FOLLOW UP: No  Activity Tolerance  Activity Tolerance: Patient Tolerated treatment well  Activity Tolerance: Pt with no c/o chest pain, SOB or dizziness throughout session. Patient Diagnosis(es): There were no encounter diagnoses.      has a past medical history of Ankle fracture, ARF (acute renal failure) ([address] ARF (acute respiratory failure) ([address] Blood transfusion, Broken jaw ([address] Duodenal ulcer, Hepatitis C, IV drug abuse ([address] Left forearm fracture, Other disorders of kidney and ureter, Renal calculus, Sepsis(995[address]91), Status post PICC central line placement, Tobacco abuse, Torn ligament, Transfusion history, and Upper GI bleed. has a past surgical history that includes Lithotripsy; Upper gastrointestinal endoscopy (8/2012); bronchoscopy (8/2012); fine needle aspiration (8/2012); Lithotripsy; Knee arthroscopy (11-8-2012); and Knee arthroscopy (Right, 9/9/2020). Restrictions  Restrictions/Precautions  Restrictions/Precautions: Weight Bearing, Up as Tolerated  Required Braces or Orthoses?: No  Lower Extremity Weight Bearing Restrictions  Right Lower Extremity Weight Bearing: Weight Bearing As Tolerated  Position Activity Restriction  Other position/activity restrictions: Hemovac R knee     Vision/Hearing  Vision: Within Functional Limits  Hearing: Within functional limits       Subjective  General  Chart Reviewed: Yes  Patient assessed for rehabilitation services?: Yes  Referring Practitioner: Prieto Larry PA-C;  Referral Date : 09/09/20  Diagnosis: Pt to ED 9/7 with laceration to R knee x 1 week ago with increased swelling and pain. XR R knee: cellulitis vs osteomyelitis. Pt was unable to tolerate MRI to further evaluation. Pt now s/p I&D, partial lateral menisectomy, chondroplasty of medial compartment and patellofemoral joint 9/9 with Dr. Marino Nunez. Follows Commands: Within Functional Limits  General Comment  Comments: Pt lying in bed upon arrival, agreeable to PT/OT Evaluation. Hemovac and peripheral IV intact throughout. Subjective  Subjective: Pt reports that he will not consider any placement other than home at this time. \"That's not going to work for me. \"    Pain Screening  Patient Currently in Pain: Yes  Pain Assessment  Pain Assessment: 0-10  Pain Level: 6  Pain Type: Surgical pain  Pain Location: Knee  Pain Orientation: Right  Pain Descriptors: Aching  Pain Frequency: Continuous  Pain Onset: On-going  Clinical Progression: Not changed  Functional Pain Assessment: Prevents or interferes some active activities and ADLs  Non-Pharmaceutical Pain Intervention(s): Ambulation/Increased Activity; Distraction;Repositioned; Emotional support  Response to Pain Intervention: Other (Comment)(9/10 pain with ambulation, requesting pain meds at end of session, calling RN.)  Vital Signs  Patient Currently in Pain: Yes       Orientation  Orientation  Overall Orientation Status: Within Functional Limits     Social/Functional History  Social/Functional History  Lives With: Friend(s)  Type of Home: Apartment(3rd floor apt)  Home Layout: One level  Home Access: Stairs to enter with rails  Entrance Stairs - Number of Steps: Pt reports ~20 steps in total to reach 3rd floor apartment  Entrance Stairs - Rails: Right  Bathroom Shower/Tub: Tub/Shower unit  Bathroom Toilet: Standard  Home Equipment: (no DME)  ADL Assistance: Independent  Homemaking Assistance: Independent  Ambulation Assistance: Independent  Transfer Assistance: Independent  Active : No  Occupation: Full time employment  Type of occupation: .      Cognition   Cognition  Overall Cognitive Status: WFL  Cognition Comment: Initial impulsivity improved with cues    Objective     Observation/Palpation  Posture: Good      RLE AROM: WFL  RLE General AROM: R knee AROM limited by pain, ankle and hip WFL  LLE AROM : WFL    Strength RLE: WFL  Comment: R knee not formally tested, ankle and hip WFL  Strength LLE: WFL  Strength RUE: WFL  Strength LUE: WFL    RLE Tone: Normotonic  LLE Tone: Normotonic     Bed mobility  Supine to Sit: Independent  Sit to Supine: Unable to assess(Pt seated in chair at end of session.)     Transfers  Sit to Stand: Modified independent(Up to SW from EOB and chair with arms.)  Stand to sit: Modified independent(Initial cue for safe RW management with good carryover.)     Ambulation  Ambulation?: Yes  WB Status: WBAT  Ambulation 1  Surface: level tile  Device: Standard Walker  Assistance: Modified Independent  Quality of Gait: Antalgic with decreased stance time on RLE with resultant short step length LLE. Pt tends to place SW too far out in front requiring cues for safety with fair carryover. Able to achieve reciprocal pattern, decreased gait speed, forward flexed posture. Distance: 200    Stairs/Curb  Stairs?: Yes  Stairs  # Steps : 3  Stairs Height: 6\"  Rails: Right ascending  Assistance: Stand by assistance  Comment: Pt ascended/descended 3 6in steps with BUE support on R rail. Cues for sequencing. Tolerated well without LOB, no concerns with being able to perform 20 to enter apartment building. Encouraged pt to rest on landings prn. Balance  Sitting - Static: Good  Sitting - Dynamic: Good  Standing - Static: Good  Standing - Dynamic: Good  Comments: With use of SW    Exercises  Quad Sets: x10 B  Gluteal Sets: x10 B  Ankle Pumps: x10 B     Plan   Plan  Times per week: 1 visit  Safety Devices  Type of devices: Left in chair, Chair alarm in place, Gait belt  Restraints  Initially in place: No      AM-PAC Score  AM-PAC Inpatient Mobility Raw Score : 21 (09/10/20 1228)  AM-PAC Inpatient T-Scale Score : 50.25 (09/10/20 1228)  Mobility Inpatient CMS 0-100% Score: 28.97 (09/10/20 1228)  Mobility Inpatient CMS G-Code Modifier : CJ (09/10/20 1228)          Goals  Short term goals  Time Frame for Short term goals: 1 visit  Short term goal 1: Pt will ambulate >150ft with LRAD and Mod I - MET 9/10  Short term goal 2: Pt will ascend/descend 3 steps with unilateral hand rail and SBA - MET 9/10  Short term goal 3: Pt will perform functional transfers with Mod I - MET 9/10  Patient Goals   Patient goals : \"To go home and eventually get back to work. \"       Therapy Time   Individual Concurrent Group Co-treatment   Time In 1107         Time Out 1126         Minutes 19         Timed Code Treatment Minutes: 9 Minutes(10 minute evaluation)       Raghu Philip, PT, DPT

## 2020-09-11 VITALS
WEIGHT: 139 LBS | BODY MASS INDEX: 20.59 KG/M2 | DIASTOLIC BLOOD PRESSURE: 80 MMHG | HEIGHT: 69 IN | RESPIRATION RATE: 16 BRPM | SYSTOLIC BLOOD PRESSURE: 136 MMHG | TEMPERATURE: 98.3 F | HEART RATE: 86 BPM | OXYGEN SATURATION: 98 %

## 2020-09-11 LAB
ANION GAP SERPL CALCULATED.3IONS-SCNC: 11 MMOL/L (ref 3–16)
BUN BLDV-MCNC: 11 MG/DL (ref 7–20)
CALCIUM SERPL-MCNC: 9.3 MG/DL (ref 8.3–10.6)
CHLORIDE BLD-SCNC: 103 MMOL/L (ref 99–110)
CO2: 26 MMOL/L (ref 21–32)
CREAT SERPL-MCNC: 0.7 MG/DL (ref 0.9–1.3)
GFR AFRICAN AMERICAN: >60
GFR NON-AFRICAN AMERICAN: >60
GLUCOSE BLD-MCNC: 99 MG/DL (ref 70–99)
POTASSIUM SERPL-SCNC: 4.2 MMOL/L (ref 3.5–5.1)
SODIUM BLD-SCNC: 140 MMOL/L (ref 136–145)

## 2020-09-11 PROCEDURE — 99232 SBSQ HOSP IP/OBS MODERATE 35: CPT | Performed by: INTERNAL MEDICINE

## 2020-09-11 PROCEDURE — APPNB30 APP NON BILLABLE TIME 0-30 MINS: Performed by: PHYSICIAN ASSISTANT

## 2020-09-11 PROCEDURE — 6360000002 HC RX W HCPCS: Performed by: NURSE PRACTITIONER

## 2020-09-11 PROCEDURE — 6360000002 HC RX W HCPCS: Performed by: PHYSICIAN ASSISTANT

## 2020-09-11 PROCEDURE — 2580000003 HC RX 258: Performed by: PHYSICIAN ASSISTANT

## 2020-09-11 PROCEDURE — 80048 BASIC METABOLIC PNL TOTAL CA: CPT

## 2020-09-11 PROCEDURE — 6360000002 HC RX W HCPCS: Performed by: INTERNAL MEDICINE

## 2020-09-11 PROCEDURE — 6370000000 HC RX 637 (ALT 250 FOR IP): Performed by: PHYSICIAN ASSISTANT

## 2020-09-11 PROCEDURE — 36415 COLL VENOUS BLD VENIPUNCTURE: CPT

## 2020-09-11 PROCEDURE — 6370000000 HC RX 637 (ALT 250 FOR IP): Performed by: NURSE PRACTITIONER

## 2020-09-11 PROCEDURE — 2580000003 HC RX 258: Performed by: INTERNAL MEDICINE

## 2020-09-11 RX ADMIN — ENOXAPARIN SODIUM 40 MG: 40 INJECTION SUBCUTANEOUS at 17:58

## 2020-09-11 RX ADMIN — MORPHINE SULFATE 2 MG: 2 INJECTION, SOLUTION INTRAMUSCULAR; INTRAVENOUS at 12:07

## 2020-09-11 RX ADMIN — MORPHINE SULFATE 2 MG: 2 INJECTION, SOLUTION INTRAMUSCULAR; INTRAVENOUS at 07:33

## 2020-09-11 RX ADMIN — FAMOTIDINE 20 MG: 20 TABLET, FILM COATED ORAL at 09:58

## 2020-09-11 RX ADMIN — KETOROLAC TROMETHAMINE 30 MG: 30 INJECTION, SOLUTION INTRAMUSCULAR at 12:48

## 2020-09-11 RX ADMIN — VANCOMYCIN HYDROCHLORIDE 1250 MG: 10 INJECTION, POWDER, LYOPHILIZED, FOR SOLUTION INTRAVENOUS at 05:46

## 2020-09-11 RX ADMIN — CEFEPIME 2 G: 2 INJECTION, POWDER, FOR SOLUTION INTRAVENOUS at 04:25

## 2020-09-11 RX ADMIN — CEFEPIME 2 G: 2 INJECTION, POWDER, FOR SOLUTION INTRAVENOUS at 17:57

## 2020-09-11 RX ADMIN — BUPRENORPHINE HCL 10 MG: 8 TABLET SUBLINGUAL at 09:59

## 2020-09-11 RX ADMIN — VANCOMYCIN HYDROCHLORIDE 1250 MG: 10 INJECTION, POWDER, LYOPHILIZED, FOR SOLUTION INTRAVENOUS at 12:48

## 2020-09-11 RX ADMIN — MORPHINE SULFATE 2 MG: 2 INJECTION, SOLUTION INTRAMUSCULAR; INTRAVENOUS at 04:25

## 2020-09-11 RX ADMIN — MORPHINE SULFATE 2 MG: 2 INJECTION, SOLUTION INTRAMUSCULAR; INTRAVENOUS at 15:46

## 2020-09-11 RX ADMIN — MORPHINE SULFATE 2 MG: 2 INJECTION, SOLUTION INTRAMUSCULAR; INTRAVENOUS at 19:45

## 2020-09-11 RX ADMIN — MORPHINE SULFATE 2 MG: 2 INJECTION, SOLUTION INTRAMUSCULAR; INTRAVENOUS at 01:31

## 2020-09-11 RX ADMIN — KETOROLAC TROMETHAMINE 30 MG: 30 INJECTION, SOLUTION INTRAMUSCULAR at 04:29

## 2020-09-11 ASSESSMENT — PAIN SCALES - GENERAL
PAINLEVEL_OUTOF10: 8
PAINLEVEL_OUTOF10: 7
PAINLEVEL_OUTOF10: 8

## 2020-09-11 NOTE — PROGRESS NOTES
Infectious Disease Follow up Notes    CC :  Septic arthritis R knee     Antibiotics:  Cefepime 2g q12  vanc 1g q8     Admit Date:   9/8/2020  Hospital Day: 4    Subjective:   He remains afebrile   Minimal pain in the knee, decreased swelling. No new concerns     Objective:     Patient Vitals for the past 8 hrs:   BP Temp Temp src Pulse Resp SpO2   09/11/20 1437 136/80 98.3 °F (36.8 °C) Oral 86 16 98 %   09/11/20 0909 (!) 140/84 98 °F (36.7 °C) Oral 93 16 98 %       EXAM:  General:  Alert, oriented, NAD  Skin warm, dry. No focal rash     HEENT:  NCAT, PERRL, sclera anicteric   ABD: Soft, flat, NT     EXT:  R knee effusion.   Non-painful ROM     SKIN: No focal rash          LINE:  IV site ok       Scheduled Meds:   vancomycin  1,250 mg Intravenous Q8H    nicotine  1 patch Transdermal Daily    enoxaparin  40 mg Subcutaneous Daily    cefepime  2 g Intravenous Q12H    buprenorphine  10 mg Sublingual BID    famotidine  20 mg Oral BID       Continuous Infusions:   sodium chloride 75 mL/hr at 09/10/20 1934          Data Review:    Lab Results   Component Value Date    WBC 7.1 09/08/2020    HGB 12.6 (L) 09/08/2020    HCT 37.3 (L) 09/08/2020    MCV 94.5 09/08/2020     09/08/2020     Lab Results   Component Value Date    CREATININE 0.7 (L) 09/11/2020    BUN 11 09/11/2020     09/11/2020    K 4.2 09/11/2020     09/11/2020    CO2 26 09/11/2020       Hepatic Function Panel:   Lab Results   Component Value Date    ALKPHOS 170 09/07/2020    ALT 29 09/07/2020    AST 31 09/07/2020    PROT 7.9 09/07/2020    PROT 6.1 08/06/2012    BILITOT 0.3 09/07/2020    BILIDIR 0.24 08/06/2012    IBILI 0.24 08/06/2012    LABALBU 4.4 09/07/2020       Cultures:   9/7       R knee SF culture S aureus, CHIVO pending, no organisms on GS   9/8 BC x2 NGTD   9/9 Operative cultures R knee NGTD, GS no organisms        Radiology Review:  All pertinent images / reports were reviewed as a part of this visit. Xray R knee 9/7/20   Impression    Severe soft tissue swelling anterior to the distal femur.  Pattern suspected    to represent cellulitis given history of recent laceration. Ashley Carota is a    remote history of osteomyelitis.  Abnormal density within the distal femur    may be sequela of that prior infection.  A more acute pattern of    osteomyelitis cannot be excluded.  Follow-up MRI would be helpful for    characterization. Assessment:     Patient Active Problem List    Diagnosis Date Noted    Septic arthritis ([facility] 09/08/2020    IVDU (intravenous drug user)     Septic joint ([facility] 09/07/2020    Status post arthroscopy of knee 11/19/2012    Chondromalacia of right knee 87/74/2631    Plica syndrome of right knee 11/07/2012    Knee pain 99/41/2685    Plica of knee 12/21/7666    Duodenal ulcer 08/26/2012    Chronic knee pain 08/26/2012    Hepatitis C 08/26/2012    Tobacco abuse 08/26/2012    GIB (gastrointestinal bleeding) 08/01/2012        Acute septic arthritis of R knee with S aureus being the etiologic agent   Pathogenesis - direct inoculation from laceration vs hematogenous seeding via IVDU   OR 9/9/20 - purulent SF noted     IVDU   Last use of methamphetamine ~1 week prior to admission     Homeless, [place]. He declines ECF after DC  Not candidate for outpt IV abx      HCV seropositive 4/2017  HBV immune     Listed allergy to cipro - \"passed out\"    Plan: For now continue vanc and cefepime  HIV screen ordered 9/8/20     Standard of care for septic joint is 3-4 weeks IV or parenteral equivalent abx with highly bioavailable agent. He declines all means by which we might provide IV abx. Suggest inpatient stay for IV Abx over the weekend.   Patient plans to stay with SO's mother and agreeable to coming to infusion center at [facility] daily for a week  There after, would consider po abx if needed    Would like to see final culture and further improvement before DC    Discussed with patient/family, all questions answered    D/w RN, Case mgmt        Luis Claudio MD  Phone: [phone]   Fax : [phone]

## 2020-09-11 NOTE — DISCHARGE INSTR - COC
Continuity of Care Form    Patient Name: Brittny Platt   :  1975  MRN:  [id number]    Admit date:  2020  Discharge date:  ***    Code Status Order: Full Code   Advance Directives:   Advance Care Flowsheet Documentation       Date/Time Healthcare Directive Type of Healthcare Directive Copy in [address] Agent's Name Healthcare Agent's Phone Number    [phone]  No, patient does not have an advance directive for healthcare treatment -- -- -- -- --            Admitting Physician:  Matilda Chirinos MD  PCP: JENNIFER Almazan - CNP    Discharging Nurse: [facility] Unit/Room#: 2391/3737-81  Discharging Unit Phone Number: ***    Emergency Contact:   Extended Emergency Contact Information  Primary Emergency Contact: [address] Phone: [phone]  Relation: Other  Preferred language: English    Past Surgical History:  Past Surgical History:   Procedure Laterality Date    BRONCHOSCOPY  2012    FINE NEEDLE ASPIRATION  2012    of knee    KNEE ARTHROSCOPY  2012    scope with synovectomy and chondroplasty right knee    KNEE ARTHROSCOPY Right 2020    RIGHT KNEE VIDEO ARTHROSCOPY WITH INCISION AND DRAINAGE, PARTIAL LATERAL MENISCECTOMY, AND CHONDROPLASTY performed by Tequila Eden MD at [address] [place] [address]      for renal calculus    UPPER GASTROINTESTINAL ENDOSCOPY  2012    duodenal ulcer       Immunization History: There is no immunization history on file for this patient.     Active Problems:  Patient Active Problem List   Diagnosis Code    GIB (gastrointestinal bleeding) K92.2    Duodenal ulcer K26.9    Chronic knee pain M25.569, G89.29    Hepatitis C B19.20    Tobacco abuse Z72.0    Knee pain S15.857    Plica of knee M41.28    Chondromalacia of right knee G97.811    Plica syndrome of right knee M67.51    Status post arthroscopy of knee Z98.890    Septic joint ([address] M00.9    IVDU (intravenous drug user) F19.90 [facility]ic arthritis [facility] M00.9       Isolation/Infection:   Isolation            No Isolation          Patient Infection Status       Infection Onset Added Last Indicated Last Indicated By Review Planned Expiration Resolved Resolved By    None active    Resolved    COVID-19 Rule Out 09/08/20 09/08/20 09/09/20 COVID-19 (Ordered)   09/09/20 Rule-Out Test Resulted            Nurse Assessment:  Last Vital Signs: BP (!) 140/84   Pulse 93   Temp 98 °F (36.7 °C) (Oral)   Resp 16   Ht 5' 9\" (1.753 m)   Wt 139 lb (63 kg)   SpO2 98%   BMI 20.53 kg/m²     Last documented pain score (0-10 scale): Pain Level: 8  Last Weight:   Wt Readings from Last 1 Encounters:   09/08/20 139 lb (63 kg)     Mental Status:  {IP PT MENTAL STATUS:20030:::0}    IV Access:  { KONSTANTIN IV ACCESS:755383582:::0}    Nursing Mobility/ADLs:  Walking   {CHP DME ADLs:827434005:::0}  Transfer  {CHP DME ADLs:643713919:::0}  Bathing  {CHP DME ADLs:869228003:::0}  Dressing  {CHP DME ADLs:082317823:::0}  Toileting  {CHP DME ADLs:024311594:::0}  Feeding  {CHP DME ADLs:724834607:::0}  Med Admin  {CHP DME ADLs:128531363:::0}  Med Delivery   { KONSTANTIN MED Delivery:558812245:::0}    Wound Care Documentation and Therapy:        Elimination:  Continence:    Bowel: {YES / EQ:66340}  Bladder: {YES / CN:85860}  Urinary Catheter: {Urinary Catheter:690588950:::0}   Colostomy/Ileostomy/Ileal Conduit: {YES / UB:57532}       Date of Last BM: ***    Intake/Output Summary (Last 24 hours) at 9/11/2020 1249  Last data filed at 9/11/2020 1008  Gross per 24 hour   Intake 240 ml   Output 9203 ml   Net -8963 ml     I/O last 3 completed shifts:  In: -   Out: 3842 [Urine:9103; Drains:15]    Safety Concerns:     [address] Safety Concerns:334607691:::0}    Impairments/Disabilities:      [address] Impairments/Disabilities:243584992:::0}    Nutrition Therapy:  Current Nutrition Therapy:   [address] Diet List:912011082:::0}    Routes of Feeding: {CHP DME Other Feedings:994685937:::0}  Liquids: {Slp liquid thickness:25318}  Daily Fluid Restriction: {CHP DME Yes amt example:120990158:::0}  Last Modified Barium Swallow with Video (Video Swallowing Test): {Done Not Done NLD:::5}    Treatments at the Time of Hospital Discharge:   Respiratory Treatments: ***  Oxygen Therapy:  {Therapy; copd oxygen:97787:::0}  Ventilator:    {[facility] Vent List:868924783:::0}    Rehab Therapies: {THERAPEUTIC INTERVENTION:9902542194}  Weight Bearing Status/Restrictions: {[facility] Weight Bearin:::0}  Other Medical Equipment (for information only, NOT a DME order):  {EQUIPMENT:687311927}  Other Treatments: ***    Patient's personal belongings (please select all that are sent with patient):  {CHP DME Belongings:478481219:::0}    RN SIGNATURE:  {Esignature:350497180:::0}    CASE MANAGEMENT/SOCIAL WORK SECTION    Inpatient Status Date: ***    Readmission Risk Assessment Score:  Readmission Risk              Risk of Unplanned Readmission:        8           Discharging to Facility/ Agency   Name:   Address:  Phone:  Fax:    Dialysis Facility (if applicable)   Name:  Address:  Dialysis Schedule:  Phone:  Fax:    / signature: {Esignature:053131429:::0}    PHYSICIAN SECTION    Prognosis: {Prognosis:6396465819:::0}    Condition at Discharge: [address] [place] [address] Patient Condition:575769362:::0}    Rehab Potential (if transferring to Rehab): {Prognosis:3985353919:::0}    Recommended Labs or Other Treatments After Discharge: ***    Physician Certification: I certify the above information and transfer of Ania Kennedy  is necessary for the continuing treatment of the diagnosis listed and that he requires {Admit to Appropriate Level of Care:21074:::0} for {GREATER/LESS:002920630} 30 days.      Update Admission H&P: {CHP DME Changes in HandP:959144310:::0}    PHYSICIAN SIGNATURE:  {Esignature:927331006:::0}

## 2020-09-11 NOTE — CARE COORDINATION
CM notes therapy recs for RW. DME order placed and spoke to Meliton Albert with [address] [place] [address]. Spoke to MALI VILLASEÑOR and updated that pt is OK to DC from ortho standpoint and that he will stay with a friend and f/u in the office. Pt  DC on PO abx. No other DCP needs identified at this time. CM following-Kelli Quintana RN     ADDENDUM 3308: Spoke with Dr. Mann Aragon and updated that pt will stay thru the weekend and then DC on Monday with OP[facility]Infusion set up at [facility].  CM following-Kelli Quintana RN

## 2020-09-11 NOTE — PROGRESS NOTES
Hospitalist Progress Note      PCP: JENNIFER Paul CNP    Date of Admission: 9/8/2020    Chief Complaint: Right knee pain and swelling    Hospital Course: See H&P    Subjective:   Patient is up in bed, comfortable, not in distress. Denies any chest pain or shortness of breath. Complaining of right knee pain. No new event overnight noted. Medications:  Reviewed    Infusion Medications    sodium chloride 75 mL/hr at 09/10/20 1934     Scheduled Medications    vancomycin  1,250 mg Intravenous Q8H    nicotine  1 patch Transdermal Daily    enoxaparin  40 mg Subcutaneous Daily    cefepime  2 g Intravenous Q12H    buprenorphine  10 mg Sublingual BID    famotidine  20 mg Oral BID     PRN Meds: ondansetron **OR** ondansetron, morphine, acetaminophen **OR** acetaminophen, polyethylene glycol, promethazine **OR** ondansetron, ketorolac      Intake/Output Summary (Last 24 hours) at 9/11/2020 1059  Last data filed at 9/11/2020 1008  Gross per 24 hour   Intake 240 ml   Output 9653 ml   Net -9413 ml       Physical Exam Performed:    BP (!) 140/84   Pulse 93   Temp 98 °F (36.7 °C) (Oral)   Resp 16   Ht 5' 9\" (1.753 m)   Wt 139 lb (63 kg)   SpO2 98%   BMI 20.53 kg/m²     General appearance: No apparent distress, appears stated age and cooperative. HEENT: Pupils equal, round, and reactive to light. Conjunctivae/corneas clear. Neck: Supple, with full range of motion. No jugular venous distention. Trachea midline. Respiratory:  Normal respiratory effort. Clear to auscultation, bilaterally without Rales/Wheezes/Rhonchi. Cardiovascular: Regular rate and rhythm with normal S1/S2 without murmurs, rubs or gallops. Abdomen: Soft, non-tender, non-distended with normal bowel sounds. Musculoskeletal: No clubbing, cyanosis or edema bilaterally. Swollen right knee. Skin: Skin color, texture, turgor normal.  No rashes or lesions.   Neurologic:  Neurovascularly intact without any focal sensory/motor deficits. Cranial nerves: II-XII intact, grossly non-focal.  Psychiatric: Alert and oriented, thought content appropriate, normal insight  Capillary Refill: Brisk,< 3 seconds   Peripheral Pulses: +2 palpable, equal bilaterally       Labs:   No results for input(s): WBC, HGB, HCT, PLT in the last 72 hours. Recent Labs     09/11/20  0806      K 4.2      CO2 26   BUN 11   CREATININE 0.7*   CALCIUM 9.3       Urinalysis:      Lab Results   Component Value Date    NITRU NEGATIVE 08/06/2012    WBCUA None seen 08/06/2012    RBCUA Rare 08/06/2012    BLOODU TRACE 08/06/2012    SPECGRAV 1.015 08/06/2012    GLUCOSEU NEGATIVE 08/06/2012       Radiology:  No orders to display           Assessment/Plan:    Active Hospital Problems    Diagnosis    Septic arthritis ([facility] [M00.9]     Priority: High     Right knee infection possible septic arthritis, patient was admitted at [facility], has arthrocentesis and suspicious for septic arthritis, admit to [facility], continue cefepime and vancomycin, ID and orthopedic surgery consult. S/p Procedure(s):  RIGHT KNEE VIDEO ARTHROSCOPY WITH INCISION AND DRAINAGE, PARTIAL LATERAL MENISCECTOMY, AND CHONDROPLASTY, done on 9/9/2020. Plan to change to PO Abx as per ID.     History of IV drug use, continue home regimen of Suboxone, supportive care.     History of hep C, untreated, further management as outpatient.     DVT Prophylaxis: Lovenox  Diet: DIET GENERAL;  Code Status: Full Code    PT/OT Eval Status: Not ordered    Dispo - possible today    Mary Hutchinson MD

## 2020-09-11 NOTE — PROGRESS NOTES
Delivered walker to [facility] room.   Thanks for the referral.  Brien Lujan  9/11/2020

## 2020-09-11 NOTE — PROGRESS NOTES
Department of Orthopedic Surgery  Physician Assistant   Progress Note    Subjective:       Systemic or Specific Complaints:Pain Control improved today. No new issues. Objective:     Patient Vitals for the past 24 hrs:   BP Temp Temp src Pulse Resp SpO2   09/11/20 0909 (!) 140/84 98 °F (36.7 °C) Oral 93 16 98 %   09/10/20 2323 135/78 98.1 °F (36.7 °C) Oral 84 16 98 %   09/10/20 1944 (!) 147/89 98.2 °F (36.8 °C) Oral 95 16 98 %   09/10/20 1502 110/76 98.9 °F (37.2 °C) Oral 100 16 98 %       General: alert, appears stated age and cooperative   Wound: Wound clean and dry no evidence of infection. , Wound Intact and Positive for Edema   Motion: Painful range of Motion in affected extremity   DVT Exam: No evidence of DVT seen on physical exam.     Additional exam:  Effusion lessened to knee. mepilex in place without drainage noted. Data Review  CBC:   Lab Results   Component Value Date    WBC 7.1 09/08/2020    RBC 3.95 09/08/2020    HGB 12.6 09/08/2020    HCT 37.3 09/08/2020     09/08/2020       Renal:   Lab Results   Component Value Date     09/11/2020    K 4.2 09/11/2020    K 4.6 09/08/2020     09/11/2020    CO2 26 09/11/2020    BUN 11 09/11/2020    CREATININE 0.7 09/11/2020    GLUCOSE 99 09/11/2020    CALCIUM 9.3 09/11/2020            Assessment:      right septic knee, I&D POD@. Plan:      1:  Continue current plan of care per IM. ID consulted and cultures NGTD at this time from OR. Aspiration cultures positive for staph aureus. 2:  Continue Deep venous thrombosis prophylaxis  3:  Continue Pain Control  4:  PT and OT, WBAT. ROM as tolerated  5:  D/c planning underway. Pt states \"its not plausible\" for him to d/c to SNF for IV abx. We discussed risks for continued infection or worsening infection with suboptimal treatment. He understands. He states he will discharge to a friends home, as he is currently homeless.   Would anticipate transition to PO abx upon discharge given meth use hx. DCP updated  6:  Okay for d/c from ortho standpoint. Follow up with Dr. Kelly Wall in 1 week.   Instructions placed    Home Mustafa PA-C

## 2020-09-12 LAB
BLOOD CULTURE, ROUTINE: NORMAL
BODY FLUID CULTURE, STERILE: ABNORMAL
CULTURE, BLOOD 2: NORMAL
GRAM STAIN RESULT: ABNORMAL
ORGANISM: ABNORMAL

## 2020-09-12 NOTE — PROGRESS NOTES
Pt requested to leave AMA. Charge nurse and clinical aware. IV removed, AMA papers signed. Pt states he has all belongings, escorted to front lobby.

## 2020-09-14 NOTE — DISCHARGE SUMMARY
Hospital Medicine Discharge Summary    Patient ID: Fiona Ruvalcaba      Patient's PCP: Merlin Snider, APRN - CNP    Admit Date: 9/8/2020     Discharge Date: 9/11/2020      Admitting Physician: Eneida Gonzalez MD     Discharge Physician: Kalani Resendiz MD     Discharge Diagnoses: Active Hospital Problems    Diagnosis    Septic arthritis ([facility] [M00.9]     Priority: High       The patient was seen and examined on day of discharge and this discharge summary is in conjunction with any daily progress note from day of discharge. Hospital Course:     Right knee infection possible septic arthritis, patient was admitted at [facility], has arthrocentesis and suspicious for septic arthritis, admit to MedSur, continue cefepime and vancomycin, ID and orthopedic surgery consult. S/p Procedure(s):  RIGHT KNEE VIDEO ARTHROSCOPY WITH INCISION AND DRAINAGE, PARTIAL LATERAL MENISCECTOMY, AND CHONDROPLASTY, done on 9/9/2020. Plan to change to PO Abx as per ID.     History of IV drug use, continue home regimen of Suboxone, supportive care.     History of hep C, untreated, further management as outpatient. Physical Exam Performed:     /80   Pulse 86   Temp 98.3 °F (36.8 °C) (Oral)   Resp 16   Ht 5' 9\" (1.753 m)   Wt 139 lb (63 kg)   SpO2 98%   BMI 20.53 kg/m²       General appearance:  No apparent distress, appears stated age and cooperative. HEENT:  Normal cephalic, atraumatic without obvious deformity. Pupils equal, round, and reactive to light. Extra ocular muscles intact. Conjunctivae/corneas clear. Neck: Supple, with full range of motion. No jugular venous distention. Trachea midline. Respiratory:  Normal respiratory effort. Clear to auscultation, bilaterally without Rales/Wheezes/Rhonchi. Cardiovascular:  Regular rate and rhythm with normal S1/S2 without murmurs, rubs or gallops. Abdomen: Soft, non-tender, non-distended with normal bowel sounds.   Musculoskeletal:  No clubbing, cyanosis or edema bilaterally. Full range of motion without deformity. Skin: Skin color, texture, turgor normal.  No rashes or lesions. Neurologic:  Neurovascularly intact without any focal sensory/motor deficits. Cranial nerves: II-XII intact, grossly non-focal.  Psychiatric:  Alert and oriented, thought content appropriate, normal insight  Capillary Refill: Brisk,< 3 seconds   Peripheral Pulses: +2 palpable, equal bilaterally       Labs: For convenience and continuity at follow-up the following most recent labs are provided:      CBC:    Lab Results   Component Value Date    WBC 7.1 09/08/2020    HGB 12.6 09/08/2020    HCT 37.3 09/08/2020     09/08/2020       Renal:    Lab Results   Component Value Date     09/11/2020    K 4.2 09/11/2020    K 4.6 09/08/2020     09/11/2020    CO2 26 09/11/2020    BUN 11 09/11/2020    CREATININE 0.7 09/11/2020    CALCIUM 9.3 09/11/2020    PHOS 4.0 08/14/2012         Significant Diagnostic Studies    Radiology:   No orders to display          Consults:     IP CONSULT TO HOSPITALIST  IP CONSULT TO ORTHOPEDIC SURGERY  PHARMACY TO DOSE VANCOMYCIN  IP CONSULT TO SOCIAL WORK  IP CONSULT TO INFECTIOUS DISEASES  IP CONSULT TO ORTHOPEDIC SURGERY    Disposition:  home     Condition at Discharge: Stable    Discharge Instructions/Follow-up:  ID and Ortho    Code Status:  Prior     Activity: activity as tolerated    Diet: regular diet      Discharge Medications:     Discharge Medication List as of 9/11/2020 10:14 PM           Details   Buprenorphine HCl (SUBUTEX SL) Place 10 mg under the tongue 2 times daily Historical Med             Time Spent on discharge is more than 30 minutes in the examination, evaluation, counseling and review of medications and discharge plan. Signed:    Wanda Hobbs MD   9/14/2020      Thank you JENNIFER Barajas - MARCIANO for the opportunity to be involved in this patient's care.  If you have any questions or concerns please feel free to contact me at [phone].

## 2020-09-28 LAB
CULTURE, JOINT AEROBIC: NORMAL
CULTURE, JOINT AEROBIC: NORMAL
CULTURE, JOINT ANAEROBIC: NORMAL
CULTURE, JOINT ANAEROBIC: NORMAL
GRAM STAIN RESULT: NORMAL
GRAM STAIN RESULT: NORMAL

## 2020-10-12 LAB
FUNGUS (MYCOLOGY) CULTURE: NORMAL
FUNGUS (MYCOLOGY) CULTURE: NORMAL
FUNGUS STAIN: NORMAL
FUNGUS STAIN: NORMAL

## 2020-10-27 LAB
AFB CULTURE (MYCOBACTERIA): NORMAL
AFB CULTURE (MYCOBACTERIA): NORMAL
AFB SMEAR: NORMAL
AFB SMEAR: NORMAL

## 2020-12-18 ENCOUNTER — HOSPITAL ENCOUNTER (EMERGENCY)
Age: 45
Discharge: HOME | End: 2020-12-18
Payer: COMMERCIAL

## 2020-12-18 ENCOUNTER — APPOINTMENT (OUTPATIENT)
Dept: GENERAL RADIOLOGY | Age: 45
End: 2020-12-18
Payer: COMMERCIAL

## 2020-12-18 VITALS
HEIGHT: 70 IN | HEART RATE: 101 BPM | OXYGEN SATURATION: 100 % | DIASTOLIC BLOOD PRESSURE: 91 MMHG | SYSTOLIC BLOOD PRESSURE: 148 MMHG | BODY MASS INDEX: 20.04 KG/M2 | TEMPERATURE: 98.2 F | RESPIRATION RATE: 16 BRPM | WEIGHT: 140 LBS

## 2020-12-18 PROCEDURE — 73030 X-RAY EXAM OF SHOULDER: CPT

## 2020-12-18 PROCEDURE — 99283 EMERGENCY DEPT VISIT LOW MDM: CPT

## 2020-12-18 PROCEDURE — 6370000000 HC RX 637 (ALT 250 FOR IP): Performed by: NURSE PRACTITIONER

## 2020-12-18 RX ORDER — LANSOPRAZOLE 15 MG/1
15 CAPSULE, DELAYED RELEASE ORAL DAILY
Qty: 30 CAPSULE | Refills: 3 | Status: SHIPPED | OUTPATIENT
Start: 2020-12-18 | End: 2021-01-10 | Stop reason: ALTCHOICE

## 2020-12-18 RX ORDER — NAPROXEN 250 MG/1
250 TABLET ORAL ONCE
Status: COMPLETED | OUTPATIENT
Start: 2020-12-18 | End: 2020-12-18

## 2020-12-18 RX ORDER — NAPROXEN 500 MG/1
500 TABLET ORAL 2 TIMES DAILY
Qty: 20 TABLET | Refills: 0 | Status: SHIPPED | OUTPATIENT
Start: 2020-12-18 | End: 2020-12-28

## 2020-12-18 RX ADMIN — NAPROXEN 250 MG: 250 TABLET ORAL at 14:17

## 2020-12-18 ASSESSMENT — PAIN SCALES - GENERAL: PAINLEVEL_OUTOF10: 8

## 2020-12-18 ASSESSMENT — PAIN DESCRIPTION - ORIENTATION: ORIENTATION: LEFT

## 2020-12-18 ASSESSMENT — PAIN DESCRIPTION - LOCATION: LOCATION: SHOULDER

## 2020-12-18 ASSESSMENT — PAIN DESCRIPTION - PAIN TYPE: TYPE: ACUTE PAIN
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
## 2020-12-21 ASSESSMENT — ENCOUNTER SYMPTOMS
COLOR CHANGE: 0
RHINORRHEA: 0
SORE THROAT: 0
ABDOMINAL PAIN: 0
SHORTNESS OF BREATH: 0

## 2020-12-22 ENCOUNTER — HOSPITAL ENCOUNTER (EMERGENCY)
Age: 45
Discharge: LEFT BEFORE DISCHARGE | End: 2020-12-22
Payer: COMMERCIAL

## 2020-12-22 VITALS
BODY MASS INDEX: 20.73 KG/M2 | WEIGHT: 140 LBS | HEART RATE: 100 BPM | DIASTOLIC BLOOD PRESSURE: 98 MMHG | SYSTOLIC BLOOD PRESSURE: 157 MMHG | OXYGEN SATURATION: 100 % | TEMPERATURE: 98.1 F | HEIGHT: 69 IN | RESPIRATION RATE: 16 BRPM

## 2020-12-22 ASSESSMENT — PAIN SCALES - GENERAL: PAINLEVEL_OUTOF10: 8

## 2020-12-22 ASSESSMENT — PAIN DESCRIPTION - PAIN TYPE: TYPE: ACUTE PAIN

## 2020-12-22 ASSESSMENT — PAIN DESCRIPTION - LOCATION: LOCATION: NECK

## 2020-12-22 ASSESSMENT — PAIN DESCRIPTION - DESCRIPTORS: DESCRIPTORS: THROBBING

## 2020-12-22 ASSESSMENT — PAIN DESCRIPTION - ORIENTATION: ORIENTATION: LEFT

## 2020-12-22 NOTE — ED TRIAGE NOTES
Chief Complaint   Patient presents with    Neck Pain     pt c/o pain in left side of neck ear and head, states had staph in right leg then left arm and concerned it has now travelled to his neck        Preferred Pharmacy: [facility]  Primary Care Provider: none  : [facility] [facility]  Phone Number:   Relation: Trinity Chowdhurysean
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
## 2021-01-10 PROCEDURE — 96375 TX/PRO/DX INJ NEW DRUG ADDON: CPT

## 2021-01-10 PROCEDURE — 96366 THER/PROPH/DIAG IV INF ADDON: CPT

## 2021-01-10 PROCEDURE — 99283 EMERGENCY DEPT VISIT LOW MDM: CPT

## 2021-01-10 PROCEDURE — 96365 THER/PROPH/DIAG IV INF INIT: CPT

## 2021-01-10 PROCEDURE — 96367 TX/PROPH/DG ADDL SEQ IV INF: CPT

## 2021-01-10 PROCEDURE — 10060 I&D ABSCESS SIMPLE/SINGLE: CPT

## 2021-01-10 ASSESSMENT — PAIN DESCRIPTION - LOCATION: LOCATION: CHEST

## 2021-01-10 ASSESSMENT — PAIN SCALES - GENERAL: PAINLEVEL_OUTOF10: 8

## 2021-01-11 ENCOUNTER — APPOINTMENT (OUTPATIENT)
Dept: CT IMAGING | Age: 46
End: 2021-01-11
Payer: COMMERCIAL

## 2021-01-11 ENCOUNTER — HOSPITAL ENCOUNTER (EMERGENCY)
Age: 46
Discharge: HOME | End: 2021-01-11
Attending: STUDENT IN AN ORGANIZED HEALTH CARE EDUCATION/TRAINING PROGRAM
Payer: COMMERCIAL

## 2021-01-11 VITALS
HEIGHT: 70 IN | WEIGHT: 141 LBS | DIASTOLIC BLOOD PRESSURE: 92 MMHG | BODY MASS INDEX: 20.19 KG/M2 | OXYGEN SATURATION: 98 % | SYSTOLIC BLOOD PRESSURE: 148 MMHG | HEART RATE: 92 BPM | TEMPERATURE: 98.8 F | RESPIRATION RATE: 16 BRPM

## 2021-01-11 DIAGNOSIS — L02.213 CHEST WALL ABSCESS: Primary | ICD-10-CM

## 2021-01-11 DIAGNOSIS — M86.9 OSTEOMYELITIS OF STERNUM (HCC): ICD-10-CM

## 2021-01-11 LAB
A/G RATIO: 0.7 (ref 1.1–2.2)
ALBUMIN SERPL-MCNC: 3.7 G/DL (ref 3.4–5)
ALP BLD-CCNC: 89 U/L (ref 40–129)
ALT SERPL-CCNC: 29 U/L (ref 10–40)
ANION GAP SERPL CALCULATED.3IONS-SCNC: 8 MMOL/L (ref 3–16)
APTT: 37 SEC (ref 24.2–36.2)
AST SERPL-CCNC: 30 U/L (ref 15–37)
BASOPHILS ABSOLUTE: 0.1 K/UL (ref 0–0.2)
BASOPHILS RELATIVE PERCENT: 0.7 %
BILIRUB SERPL-MCNC: <0.2 MG/DL (ref 0–1)
BUN BLDV-MCNC: 15 MG/DL (ref 7–20)
CALCIUM SERPL-MCNC: 9.6 MG/DL (ref 8.3–10.6)
CHLORIDE BLD-SCNC: 98 MMOL/L (ref 99–110)
CO2: 29 MMOL/L (ref 21–32)
CREAT SERPL-MCNC: 0.8 MG/DL (ref 0.9–1.3)
EOSINOPHILS ABSOLUTE: 0.1 K/UL (ref 0–0.6)
EOSINOPHILS RELATIVE PERCENT: 1.4 %
GFR AFRICAN AMERICAN: >60
GFR NON-AFRICAN AMERICAN: >60
GLOBULIN: 5 G/DL
GLUCOSE BLD-MCNC: 101 MG/DL (ref 70–99)
HCT VFR BLD CALC: 30.2 % (ref 40.5–52.5)
HEMOGLOBIN: 10.1 G/DL (ref 13.5–17.5)
INR BLD: 1.13 (ref 0.86–1.14)
LACTIC ACID, SEPSIS: 0.4 MMOL/L (ref 0.4–1.9)
LYMPHOCYTES ABSOLUTE: 1.8 K/UL (ref 1–5.1)
LYMPHOCYTES RELATIVE PERCENT: 16.8 %
MCH RBC QN AUTO: 28.7 PG (ref 26–34)
MCHC RBC AUTO-ENTMCNC: 33.5 G/DL (ref 31–36)
MCV RBC AUTO: 85.8 FL (ref 80–100)
MONOCYTES ABSOLUTE: 0.7 K/UL (ref 0–1.3)
MONOCYTES RELATIVE PERCENT: 6.6 %
NEUTROPHILS ABSOLUTE: 8.1 K/UL (ref 1.7–7.7)
NEUTROPHILS RELATIVE PERCENT: 74.5 %
PDW BLD-RTO: 13.6 % (ref 12.4–15.4)
PLATELET # BLD: 429 K/UL (ref 135–450)
PMV BLD AUTO: 6.2 FL (ref 5–10.5)
POTASSIUM REFLEX MAGNESIUM: 4 MMOL/L (ref 3.5–5.1)
PROTHROMBIN TIME: 13.1 SEC (ref 10–13.2)
RBC # BLD: 3.52 M/UL (ref 4.2–5.9)
SODIUM BLD-SCNC: 135 MMOL/L (ref 136–145)
TOTAL PROTEIN: 8.7 G/DL (ref 6.4–8.2)
WBC # BLD: 10.9 K/UL (ref 4–11)

## 2021-01-11 PROCEDURE — 85610 PROTHROMBIN TIME: CPT

## 2021-01-11 PROCEDURE — 85730 THROMBOPLASTIN TIME PARTIAL: CPT

## 2021-01-11 PROCEDURE — 83605 ASSAY OF LACTIC ACID: CPT

## 2021-01-11 PROCEDURE — 74177 CT ABD & PELVIS W/CONTRAST: CPT

## 2021-01-11 PROCEDURE — 96367 TX/PROPH/DG ADDL SEQ IV INF: CPT

## 2021-01-11 PROCEDURE — 6360000004 HC RX CONTRAST MEDICATION: Performed by: STUDENT IN AN ORGANIZED HEALTH CARE EDUCATION/TRAINING PROGRAM

## 2021-01-11 PROCEDURE — 71260 CT THORAX DX C+: CPT

## 2021-01-11 PROCEDURE — 85025 COMPLETE CBC W/AUTO DIFF WBC: CPT

## 2021-01-11 PROCEDURE — 2580000003 HC RX 258: Performed by: STUDENT IN AN ORGANIZED HEALTH CARE EDUCATION/TRAINING PROGRAM

## 2021-01-11 PROCEDURE — 96375 TX/PRO/DX INJ NEW DRUG ADDON: CPT

## 2021-01-11 PROCEDURE — 87040 BLOOD CULTURE FOR BACTERIA: CPT

## 2021-01-11 PROCEDURE — 80053 COMPREHEN METABOLIC PANEL: CPT

## 2021-01-11 PROCEDURE — 96365 THER/PROPH/DIAG IV INF INIT: CPT

## 2021-01-11 PROCEDURE — 96366 THER/PROPH/DIAG IV INF ADDON: CPT

## 2021-01-11 PROCEDURE — 6360000002 HC RX W HCPCS: Performed by: STUDENT IN AN ORGANIZED HEALTH CARE EDUCATION/TRAINING PROGRAM

## 2021-01-11 RX ORDER — 0.9 % SODIUM CHLORIDE 0.9 %
1000 INTRAVENOUS SOLUTION INTRAVENOUS ONCE
Status: COMPLETED | OUTPATIENT
Start: 2021-01-11 | End: 2021-01-11

## 2021-01-11 RX ORDER — KETOROLAC TROMETHAMINE 30 MG/ML
15 INJECTION, SOLUTION INTRAMUSCULAR; INTRAVENOUS ONCE
Status: COMPLETED | OUTPATIENT
Start: 2021-01-11 | End: 2021-01-11

## 2021-01-11 RX ORDER — MORPHINE SULFATE 4 MG/ML
4 INJECTION, SOLUTION INTRAMUSCULAR; INTRAVENOUS ONCE
Status: COMPLETED | OUTPATIENT
Start: 2021-01-11 | End: 2021-01-11

## 2021-01-11 RX ADMIN — KETOROLAC TROMETHAMINE 15 MG: 30 INJECTION, SOLUTION INTRAMUSCULAR; INTRAVENOUS at 02:33

## 2021-01-11 RX ADMIN — CEFTRIAXONE SODIUM 1 G: 1 INJECTION, POWDER, FOR SOLUTION INTRAMUSCULAR; INTRAVENOUS at 02:33

## 2021-01-11 RX ADMIN — MORPHINE SULFATE 4 MG: 4 INJECTION INTRAVENOUS at 03:37

## 2021-01-11 RX ADMIN — IOPAMIDOL 75 ML: 755 INJECTION, SOLUTION INTRAVENOUS at 03:26

## 2021-01-11 RX ADMIN — VANCOMYCIN HYDROCHLORIDE 1250 MG: 10 INJECTION, POWDER, LYOPHILIZED, FOR SOLUTION INTRAVENOUS at 02:57

## 2021-01-11 RX ADMIN — SODIUM CHLORIDE 1000 ML: 9 INJECTION, SOLUTION INTRAVENOUS at 02:32

## 2021-01-11 RX ADMIN — IOPAMIDOL 75 ML: 755 INJECTION, SOLUTION INTRAVENOUS at 05:17

## 2021-01-11 ASSESSMENT — ENCOUNTER SYMPTOMS
COUGH: 0
RHINORRHEA: 0
CONSTIPATION: 0
VOMITING: 0
SHORTNESS OF BREATH: 1
ABDOMINAL PAIN: 0
SORE THROAT: 0
NAUSEA: 0

## 2021-01-11 ASSESSMENT — PAIN SCALES - GENERAL: PAINLEVEL_OUTOF10: 10

## 2021-01-11 NOTE — PROGRESS NOTES
Admitting Physician:  Vancomycin administered per consult by ED provider. Please re-consult Pharmacy if you wish to continue vancomycin after admission. Thanks.   Pharmacy
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
## 2021-01-11 NOTE — ED NOTES
4921 Placed call to transfer center for consult with cardiothoracic surg per Dr. Aung Vang  01/11/21 0412

## 2021-01-11 NOTE — ED NOTES
4600  [address] Ct served ortho surg per Dr. Sandra Yusuf Cough returned call     [employer]  01/11/21 [facility]  01/11/21 1198

## 2021-01-11 NOTE — ED NOTES
[facility] returned the call with Dr. Haile Or on the line @ [facility]  01/11/21 3010

## 2021-01-11 NOTE — Clinical Note
Patient unable to be found and left before evaluation complete.  Pt had an peripheral IV in Right [facility]

## 2021-01-15 LAB
BLOOD CULTURE, ROUTINE: NORMAL
CULTURE, BLOOD 2: NORMAL

## 2022-01-17 ENCOUNTER — HOSPITAL ENCOUNTER (EMERGENCY)
Age: 47
Discharge: HOME | End: 2022-01-18
Payer: COMMERCIAL

## 2022-01-17 VITALS
WEIGHT: 149 LBS | RESPIRATION RATE: 18 BRPM | OXYGEN SATURATION: 99 % | HEIGHT: 70 IN | BODY MASS INDEX: 21.33 KG/M2 | SYSTOLIC BLOOD PRESSURE: 170 MMHG | DIASTOLIC BLOOD PRESSURE: 89 MMHG | HEART RATE: 90 BPM | TEMPERATURE: 98.3 F

## 2022-01-17 DIAGNOSIS — L03.119 CELLULITIS AND ABSCESS OF HAND, EXCEPT FINGERS AND THUMB: Primary | ICD-10-CM

## 2022-01-17 DIAGNOSIS — L02.519 CELLULITIS AND ABSCESS OF HAND, EXCEPT FINGERS AND THUMB: Primary | ICD-10-CM

## 2022-01-17 PROCEDURE — 99284 EMERGENCY DEPT VISIT MOD MDM: CPT

## 2022-01-17 ASSESSMENT — PAIN DESCRIPTION - DESCRIPTORS: DESCRIPTORS: SHARP

## 2022-01-17 ASSESSMENT — PAIN DESCRIPTION - ORIENTATION: ORIENTATION: LEFT

## 2022-01-17 ASSESSMENT — PAIN DESCRIPTION - LOCATION: LOCATION: HAND

## 2022-01-17 ASSESSMENT — PAIN DESCRIPTION - PAIN TYPE: TYPE: ACUTE PAIN

## 2022-01-17 ASSESSMENT — PAIN DESCRIPTION - FREQUENCY: FREQUENCY: CONTINUOUS

## 2022-01-17 ASSESSMENT — PAIN SCALES - GENERAL: PAINLEVEL_OUTOF10: 5

## 2022-01-18 ENCOUNTER — APPOINTMENT (OUTPATIENT)
Dept: GENERAL RADIOLOGY | Age: 47
End: 2022-01-18
Payer: COMMERCIAL

## 2022-01-18 PROCEDURE — 73130 X-RAY EXAM OF HAND: CPT

## 2022-01-18 PROCEDURE — 6370000000 HC RX 637 (ALT 250 FOR IP): Performed by: PHYSICIAN ASSISTANT

## 2022-01-18 RX ORDER — SULFAMETHOXAZOLE AND TRIMETHOPRIM 800; 160 MG/1; MG/1
1 TABLET ORAL 2 TIMES DAILY
Qty: 14 TABLET | Refills: 0 | Status: SHIPPED | OUTPATIENT
Start: 2022-01-18 | End: 2022-01-25

## 2022-01-18 RX ORDER — LIDOCAINE HYDROCHLORIDE AND EPINEPHRINE 10; 10 MG/ML; UG/ML
INJECTION, SOLUTION INFILTRATION; PERINEURAL
Status: DISCONTINUED
Start: 2022-01-18 | End: 2022-01-18 | Stop reason: HOSPADM

## 2022-01-18 RX ORDER — IBUPROFEN 800 MG/1
800 TABLET ORAL ONCE
Status: COMPLETED | OUTPATIENT
Start: 2022-01-18 | End: 2022-01-18

## 2022-01-18 RX ORDER — CEPHALEXIN 500 MG/1
500 CAPSULE ORAL ONCE
Status: COMPLETED | OUTPATIENT
Start: 2022-01-18 | End: 2022-01-18

## 2022-01-18 RX ORDER — SULFAMETHOXAZOLE AND TRIMETHOPRIM 800; 160 MG/1; MG/1
1 TABLET ORAL ONCE
Status: COMPLETED | OUTPATIENT
Start: 2022-01-18 | End: 2022-01-18

## 2022-01-18 RX ORDER — CEPHALEXIN 500 MG/1
500 CAPSULE ORAL 4 TIMES DAILY
Qty: 28 CAPSULE | Refills: 0 | Status: SHIPPED | OUTPATIENT
Start: 2022-01-18 | End: 2022-01-25

## 2022-01-18 RX ADMIN — CEPHALEXIN 500 MG: 500 CAPSULE ORAL at 00:51

## 2022-01-18 RX ADMIN — SULFAMETHOXAZOLE AND TRIMETHOPRIM 1 TABLET: 800; 160 TABLET ORAL at 00:51

## 2022-01-18 RX ADMIN — IBUPROFEN 800 MG: 800 TABLET, FILM COATED ORAL at 00:51
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
## 2022-05-18 ENCOUNTER — APPOINTMENT (OUTPATIENT)
Dept: CT IMAGING | Age: 47
End: 2022-05-18
Payer: COMMERCIAL

## 2022-05-18 ENCOUNTER — HOSPITAL ENCOUNTER (EMERGENCY)
Age: 47
Discharge: HOME | End: 2022-05-18
Payer: COMMERCIAL

## 2022-05-18 VITALS
TEMPERATURE: 98.5 F | DIASTOLIC BLOOD PRESSURE: 85 MMHG | OXYGEN SATURATION: 100 % | RESPIRATION RATE: 16 BRPM | HEIGHT: 69 IN | BODY MASS INDEX: 21.48 KG/M2 | WEIGHT: 145 LBS | SYSTOLIC BLOOD PRESSURE: 134 MMHG | HEART RATE: 100 BPM

## 2022-05-18 DIAGNOSIS — R11.0 NAUSEA: ICD-10-CM

## 2022-05-18 DIAGNOSIS — Z53.21 ELOPED FROM EMERGENCY DEPARTMENT: ICD-10-CM

## 2022-05-18 DIAGNOSIS — R10.84 GENERALIZED ABDOMINAL PAIN: Primary | ICD-10-CM

## 2022-05-18 DIAGNOSIS — R74.8 ELEVATED LIVER ENZYMES: ICD-10-CM

## 2022-05-18 LAB
A/G RATIO: 1.1 (ref 1.1–2.2)
ALBUMIN SERPL-MCNC: 3.9 G/DL (ref 3.4–5)
ALP BLD-CCNC: 156 U/L (ref 40–129)
ALT SERPL-CCNC: 27 U/L (ref 10–40)
ANION GAP SERPL CALCULATED.3IONS-SCNC: 9 MMOL/L (ref 3–16)
AST SERPL-CCNC: 40 U/L (ref 15–37)
BASOPHILS ABSOLUTE: 0 K/UL (ref 0–0.2)
BASOPHILS RELATIVE PERCENT: 0.2 %
BILIRUB SERPL-MCNC: 0.5 MG/DL (ref 0–1)
BILIRUBIN URINE: NEGATIVE
BLOOD, URINE: ABNORMAL
BUN BLDV-MCNC: 11 MG/DL (ref 7–20)
CALCIUM SERPL-MCNC: 8.8 MG/DL (ref 8.3–10.6)
CHLORIDE BLD-SCNC: 100 MMOL/L (ref 99–110)
CLARITY: CLEAR
CO2: 23 MMOL/L (ref 21–32)
COLOR: YELLOW
CREAT SERPL-MCNC: 0.6 MG/DL (ref 0.9–1.3)
EKG ATRIAL RATE: 102 BPM
EKG DIAGNOSIS: NORMAL
EKG P AXIS: 72 DEGREES
EKG P-R INTERVAL: 144 MS
EKG Q-T INTERVAL: 318 MS
EKG QRS DURATION: 94 MS
EKG QTC CALCULATION (BAZETT): 414 MS
EKG R AXIS: 8 DEGREES
EKG T AXIS: 54 DEGREES
EKG VENTRICULAR RATE: 102 BPM
EOSINOPHILS ABSOLUTE: 0 K/UL (ref 0–0.6)
EOSINOPHILS RELATIVE PERCENT: 0.2 %
GFR AFRICAN AMERICAN: >60
GFR NON-AFRICAN AMERICAN: >60
GLUCOSE BLD-MCNC: 104 MG/DL (ref 70–99)
GLUCOSE URINE: NEGATIVE MG/DL
HCT VFR BLD CALC: 34.3 % (ref 40.5–52.5)
HEMOGLOBIN: 11.6 G/DL (ref 13.5–17.5)
INFLUENZA A: NOT DETECTED
INFLUENZA B: NOT DETECTED
KETONES, URINE: NEGATIVE MG/DL
LEUKOCYTE ESTERASE, URINE: NEGATIVE
LIPASE: 61 U/L (ref 13–60)
LYMPHOCYTES ABSOLUTE: 0.3 K/UL (ref 1–5.1)
LYMPHOCYTES RELATIVE PERCENT: 2.2 %
MCH RBC QN AUTO: 30.7 PG (ref 26–34)
MCHC RBC AUTO-ENTMCNC: 33.7 G/DL (ref 31–36)
MCV RBC AUTO: 91 FL (ref 80–100)
MICROSCOPIC EXAMINATION: YES
MONOCYTES ABSOLUTE: 0 K/UL (ref 0–1.3)
MONOCYTES RELATIVE PERCENT: 0.4 %
MUCUS: ABNORMAL /LPF
NEUTROPHILS ABSOLUTE: 12.1 K/UL (ref 1.7–7.7)
NEUTROPHILS RELATIVE PERCENT: 97 %
NITRITE, URINE: NEGATIVE
PDW BLD-RTO: 13.1 % (ref 12.4–15.4)
PH UA: 6 (ref 5–8)
PLATELET # BLD: 170 K/UL (ref 135–450)
PMV BLD AUTO: 6.6 FL (ref 5–10.5)
POTASSIUM REFLEX MAGNESIUM: 4.5 MMOL/L (ref 3.5–5.1)
PROTEIN UA: NEGATIVE MG/DL
RBC # BLD: 3.77 M/UL (ref 4.2–5.9)
RBC UA: ABNORMAL /HPF (ref 0–4)
SARS-COV-2 RNA, RT PCR: NOT DETECTED
SODIUM BLD-SCNC: 132 MMOL/L (ref 136–145)
SPECIFIC GRAVITY UA: 1.01 (ref 1–1.03)
TOTAL CK: 51 U/L (ref 39–308)
TOTAL PROTEIN: 7.3 G/DL (ref 6.4–8.2)
TROPONIN: <0.01 NG/ML
URINE REFLEX TO CULTURE: ABNORMAL
URINE TYPE: ABNORMAL
UROBILINOGEN, URINE: 0.2 E.U./DL
WBC # BLD: 12.4 K/UL (ref 4–11)
WBC UA: ABNORMAL /HPF (ref 0–5)

## 2022-05-18 PROCEDURE — 36415 COLL VENOUS BLD VENIPUNCTURE: CPT

## 2022-05-18 PROCEDURE — 93010 ELECTROCARDIOGRAM REPORT: CPT | Performed by: INTERNAL MEDICINE

## 2022-05-18 PROCEDURE — 6370000000 HC RX 637 (ALT 250 FOR IP): Performed by: PHYSICIAN ASSISTANT

## 2022-05-18 PROCEDURE — 96374 THER/PROPH/DIAG INJ IV PUSH: CPT

## 2022-05-18 PROCEDURE — 83690 ASSAY OF LIPASE: CPT

## 2022-05-18 PROCEDURE — 87636 SARSCOV2 & INF A&B AMP PRB: CPT

## 2022-05-18 PROCEDURE — 96361 HYDRATE IV INFUSION ADD-ON: CPT

## 2022-05-18 PROCEDURE — 85025 COMPLETE CBC W/AUTO DIFF WBC: CPT

## 2022-05-18 PROCEDURE — 81001 URINALYSIS AUTO W/SCOPE: CPT

## 2022-05-18 PROCEDURE — 82550 ASSAY OF CK (CPK): CPT

## 2022-05-18 PROCEDURE — 6360000004 HC RX CONTRAST MEDICATION: Performed by: PHYSICIAN ASSISTANT

## 2022-05-18 PROCEDURE — 93005 ELECTROCARDIOGRAM TRACING: CPT | Performed by: PHYSICIAN ASSISTANT

## 2022-05-18 PROCEDURE — 96375 TX/PRO/DX INJ NEW DRUG ADDON: CPT

## 2022-05-18 PROCEDURE — 74177 CT ABD & PELVIS W/CONTRAST: CPT

## 2022-05-18 PROCEDURE — 99285 EMERGENCY DEPT VISIT HI MDM: CPT

## 2022-05-18 PROCEDURE — 80053 COMPREHEN METABOLIC PANEL: CPT

## 2022-05-18 PROCEDURE — 84484 ASSAY OF TROPONIN QUANT: CPT

## 2022-05-18 PROCEDURE — 6360000002 HC RX W HCPCS: Performed by: PHYSICIAN ASSISTANT

## 2022-05-18 PROCEDURE — 2580000003 HC RX 258: Performed by: PHYSICIAN ASSISTANT

## 2022-05-18 RX ORDER — ONDANSETRON 2 MG/ML
4 INJECTION INTRAMUSCULAR; INTRAVENOUS EVERY 6 HOURS PRN
Status: DISCONTINUED | OUTPATIENT
Start: 2022-05-18 | End: 2022-05-18 | Stop reason: HOSPADM

## 2022-05-18 RX ORDER — KETOROLAC TROMETHAMINE 30 MG/ML
15 INJECTION, SOLUTION INTRAMUSCULAR; INTRAVENOUS ONCE
Status: COMPLETED | OUTPATIENT
Start: 2022-05-18 | End: 2022-05-18

## 2022-05-18 RX ORDER — 0.9 % SODIUM CHLORIDE 0.9 %
1000 INTRAVENOUS SOLUTION INTRAVENOUS ONCE
Status: COMPLETED | OUTPATIENT
Start: 2022-05-18 | End: 2022-05-18

## 2022-05-18 RX ADMIN — LIDOCAINE HYDROCHLORIDE: 20 SOLUTION ORAL; TOPICAL at 15:52

## 2022-05-18 RX ADMIN — IOPAMIDOL 75 ML: 755 INJECTION, SOLUTION INTRAVENOUS at 15:49

## 2022-05-18 RX ADMIN — ONDANSETRON HYDROCHLORIDE 4 MG: 2 INJECTION, SOLUTION INTRAMUSCULAR; INTRAVENOUS at 16:45

## 2022-05-18 RX ADMIN — SODIUM CHLORIDE 1000 ML: 9 INJECTION, SOLUTION INTRAVENOUS at 16:47

## 2022-05-18 RX ADMIN — KETOROLAC TROMETHAMINE 15 MG: 30 INJECTION, SOLUTION INTRAMUSCULAR at 16:44

## 2022-05-18 ASSESSMENT — PAIN - FUNCTIONAL ASSESSMENT: PAIN_FUNCTIONAL_ASSESSMENT: 0-10

## 2022-05-18 ASSESSMENT — ENCOUNTER SYMPTOMS
NAUSEA: 1
RESPIRATORY NEGATIVE: 1
VOMITING: 1
ABDOMINAL PAIN: 1

## 2022-05-18 ASSESSMENT — PAIN SCALES - GENERAL: PAINLEVEL_OUTOF10: 8

## 2022-05-18 ASSESSMENT — PAIN DESCRIPTION - LOCATION: LOCATION: LEG

## 2022-05-18 NOTE — ED TRIAGE NOTES
Pt states bilat upper leg pain. Pt states pain in back of neck from staph infection that he has never been treated for.
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
## 2022-05-18 NOTE — ED PROVIDER NOTES
The Ekg interpreted by me shows  sinus tachycardia, exfr=293  Axis is   Normal  QTc is  normal  Intervals and Durations are unremarkable.       ST Segments: nonspecific changes  Sinus tachycardia is new, otherwise no significant change compared to previous performed 11/29/2019           Tirso Chavez MD  05/18/22 0269

## 2022-05-19 NOTE — ED NOTES
Pt was noted to no be in chair where he had been. Pt was not seen by any staff member leaving. This RN called the [facility] department since pt left with an IV in place. This RN gave department a description of pt and where he could possibly be living at this time.      Rama Santana RN  05/18/22 2036

## 2023-03-03 ENCOUNTER — HOSPITAL ENCOUNTER (EMERGENCY)
Age: 48
Discharge: HOME | End: 2023-03-03
Attending: EMERGENCY MEDICINE
Payer: COMMERCIAL

## 2023-03-03 ENCOUNTER — APPOINTMENT (OUTPATIENT)
Dept: GENERAL RADIOLOGY | Age: 48
End: 2023-03-03
Payer: COMMERCIAL

## 2023-03-03 VITALS
OXYGEN SATURATION: 100 % | SYSTOLIC BLOOD PRESSURE: 150 MMHG | TEMPERATURE: 97.6 F | DIASTOLIC BLOOD PRESSURE: 86 MMHG | RESPIRATION RATE: 18 BRPM | HEART RATE: 85 BPM

## 2023-03-03 DIAGNOSIS — S91.109A OPEN TOE WOUND, INITIAL ENCOUNTER: ICD-10-CM

## 2023-03-03 DIAGNOSIS — T69.021A TRENCH FOOT OF RIGHT LOWER EXTREMITY, INITIAL ENCOUNTER: Primary | ICD-10-CM

## 2023-03-03 DIAGNOSIS — R03.0 ELEVATED BLOOD PRESSURE READING: ICD-10-CM

## 2023-03-03 DIAGNOSIS — L03.031 CELLULITIS OF TOE OF RIGHT FOOT: ICD-10-CM

## 2023-03-03 LAB
A/G RATIO: 1 (ref 1.1–2.2)
ALBUMIN SERPL-MCNC: 4.3 G/DL (ref 3.4–5)
ALP BLD-CCNC: 112 U/L (ref 40–129)
ALT SERPL-CCNC: 30 U/L (ref 10–40)
ANION GAP SERPL CALCULATED.3IONS-SCNC: 9 MMOL/L (ref 3–16)
AST SERPL-CCNC: 29 U/L (ref 15–37)
BASOPHILS ABSOLUTE: 0.1 K/UL (ref 0–0.2)
BASOPHILS RELATIVE PERCENT: 0.9 %
BILIRUB SERPL-MCNC: <0.2 MG/DL (ref 0–1)
BUN BLDV-MCNC: 11 MG/DL (ref 7–20)
CALCIUM SERPL-MCNC: 9.5 MG/DL (ref 8.3–10.6)
CHLORIDE BLD-SCNC: 102 MMOL/L (ref 99–110)
CO2: 29 MMOL/L (ref 21–32)
CREAT SERPL-MCNC: 0.6 MG/DL (ref 0.9–1.3)
EOSINOPHILS ABSOLUTE: 0.2 K/UL (ref 0–0.6)
EOSINOPHILS RELATIVE PERCENT: 2.3 %
GFR SERPL CREATININE-BSD FRML MDRD: >60 ML/MIN/{1.73_M2}
GLUCOSE BLD-MCNC: 105 MG/DL (ref 70–99)
HCT VFR BLD CALC: 37.8 % (ref 40.5–52.5)
HEMOGLOBIN: 13.1 G/DL (ref 13.5–17.5)
LACTIC ACID, SEPSIS: 0.9 MMOL/L (ref 0.4–1.9)
LYMPHOCYTES ABSOLUTE: 2.6 K/UL (ref 1–5.1)
LYMPHOCYTES RELATIVE PERCENT: 38.1 %
MCH RBC QN AUTO: 31.6 PG (ref 26–34)
MCHC RBC AUTO-ENTMCNC: 34.6 G/DL (ref 31–36)
MCV RBC AUTO: 91.2 FL (ref 80–100)
MONOCYTES ABSOLUTE: 0.7 K/UL (ref 0–1.3)
MONOCYTES RELATIVE PERCENT: 10.8 %
NEUTROPHILS ABSOLUTE: 3.3 K/UL (ref 1.7–7.7)
NEUTROPHILS RELATIVE PERCENT: 47.9 %
PDW BLD-RTO: 12.5 % (ref 12.4–15.4)
PLATELET # BLD: 273 K/UL (ref 135–450)
PMV BLD AUTO: 6.9 FL (ref 5–10.5)
POTASSIUM REFLEX MAGNESIUM: 3.8 MMOL/L (ref 3.5–5.1)
PROCALCITONIN: 0.1 NG/ML (ref 0–0.15)
RBC # BLD: 4.14 M/UL (ref 4.2–5.9)
SODIUM BLD-SCNC: 140 MMOL/L (ref 136–145)
TOTAL PROTEIN: 8.8 G/DL (ref 6.4–8.2)
WBC # BLD: 6.9 K/UL (ref 4–11)

## 2023-03-03 PROCEDURE — 36415 COLL VENOUS BLD VENIPUNCTURE: CPT

## 2023-03-03 PROCEDURE — 84145 PROCALCITONIN (PCT): CPT

## 2023-03-03 PROCEDURE — 80053 COMPREHEN METABOLIC PANEL: CPT

## 2023-03-03 PROCEDURE — 6370000000 HC RX 637 (ALT 250 FOR IP): Performed by: REGISTERED NURSE

## 2023-03-03 PROCEDURE — 99284 EMERGENCY DEPT VISIT MOD MDM: CPT

## 2023-03-03 PROCEDURE — 73630 X-RAY EXAM OF FOOT: CPT

## 2023-03-03 PROCEDURE — 85025 COMPLETE CBC W/AUTO DIFF WBC: CPT

## 2023-03-03 PROCEDURE — 83605 ASSAY OF LACTIC ACID: CPT

## 2023-03-03 RX ORDER — CEPHALEXIN 500 MG/1
500 CAPSULE ORAL ONCE
Status: COMPLETED | OUTPATIENT
Start: 2023-03-03 | End: 2023-03-03

## 2023-03-03 RX ORDER — CEPHALEXIN 500 MG/1
500 CAPSULE ORAL 4 TIMES DAILY
Qty: 28 CAPSULE | Refills: 0 | Status: SHIPPED | OUTPATIENT
Start: 2023-03-03 | End: 2023-03-10

## 2023-03-03 RX ORDER — SULFAMETHOXAZOLE AND TRIMETHOPRIM 800; 160 MG/1; MG/1
1 TABLET ORAL ONCE
Status: COMPLETED | OUTPATIENT
Start: 2023-03-03 | End: 2023-03-03

## 2023-03-03 RX ORDER — SULFAMETHOXAZOLE AND TRIMETHOPRIM 800; 160 MG/1; MG/1
1 TABLET ORAL 2 TIMES DAILY
Qty: 14 TABLET | Refills: 0 | Status: SHIPPED | OUTPATIENT
Start: 2023-03-03 | End: 2023-03-10

## 2023-03-03 RX ADMIN — CEPHALEXIN 500 MG: 500 CAPSULE ORAL at 19:21

## 2023-03-03 RX ADMIN — SULFAMETHOXAZOLE AND TRIMETHOPRIM 1 TABLET: 800; 160 TABLET ORAL at 19:21

## 2023-03-03 ASSESSMENT — ENCOUNTER SYMPTOMS
COLOR CHANGE: 1
NAUSEA: 0
RHINORRHEA: 0
SORE THROAT: 0
BACK PAIN: 0
ABDOMINAL PAIN: 0
DIARRHEA: 0
COUGH: 0
CONSTIPATION: 0
SHORTNESS OF BREATH: 0
VOMITING: 0

## 2023-03-03 ASSESSMENT — PAIN - FUNCTIONAL ASSESSMENT
PAIN_FUNCTIONAL_ASSESSMENT: 0-10
PAIN_FUNCTIONAL_ASSESSMENT: 0-10

## 2023-03-03 ASSESSMENT — PAIN DESCRIPTION - LOCATION
LOCATION: FOOT
LOCATION: FOOT

## 2023-03-03 ASSESSMENT — PAIN SCALES - GENERAL
PAINLEVEL_OUTOF10: 8
PAINLEVEL_OUTOF10: 3

## 2023-03-03 ASSESSMENT — PAIN DESCRIPTION - ORIENTATION
ORIENTATION: RIGHT
ORIENTATION: RIGHT

## 2023-03-03 NOTE — ED NOTES
Pt foot soaking in chlorhexidine warm water basin at this time.       Tabitha Virgen RN  03/03/23 72
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
## 2023-03-04 NOTE — ED PROVIDER NOTES
ED Attending Attestation Note    This patient was seen by the advanced practice provider. I personally saw the patient and performed a substantive portion of the visit including all aspects of the medical decision making. Briefly, 52 y.o. male presents with possible infection to his right foot and middle toe. He had significant moisture exposure to that foot. .     Focused exam:   Gen: 52 y.o. male, NAD  Patient's right foot has erythema extending to the midfoot, with open wound noted to the dorsal aspect of the right middle toe. MDM:   Patient presenting with right foot wound and associated cellulitis on my exam.  No evidence of osteo on imaging and clinically no evidence of any necrosis or crepitus to suggest necrotizing fasciitis or more significant infection. At this time I felt that is reasonable to treat with oral antibiotics and close outpatient follow-up. Patient will be prescribed Bactrim and Keflex and advised to follow closely. Patient to return with any new or worsening symptoms and all questions answered at time of discharge. For further details of the patient's emergency department visit, please see the advanced practice provider's documentation. Malvin Marino MD     This report has been produced using speech recognition software and may contain errors related to that system including errors in grammar, punctuation, and spelling, as well as words and phrases that may be inappropriate. If there are any questions or concerns please feel free to contact the dictating provider for clarification.         Malvin Marino MD  03/03/23 8763

## 2023-03-04 NOTE — ED NOTES
--Patient provided with discharge instructions and any prescriptions. --Instructions, dosing, and follow-up appointments reviewed with patient/family. No further questions or needs at this time. --Vital signs and patient stable upon discharge. --Patient ambulatory to [facility].        Radha Granados RN  03/03/23 9602

## 2023-03-04 NOTE — ED NOTES
Pt wound wrapped with non adherent dressing, gauze and secured with gauze roll and tape. Pt instructed on use and verbalized understanding.          Jennifer Montez RN  03/03/23 3245

## (undated) DEVICE — SPONGE LAP W18XL18IN WHT COT 4 PLY FLD STRUNG RADPQ DISP ST

## (undated) DEVICE — STERILE PVP: Brand: MEDLINE INDUSTRIES, INC.

## (undated) DEVICE — CHLORAPREP 26ML ORANGE

## (undated) DEVICE — GLOVE ORANGE PI 8   MSG9080

## (undated) DEVICE — GOWN SIRUS NONREIN LG W/TWL: Brand: MEDLINE INDUSTRIES, INC.

## (undated) DEVICE — SUTURE MCRYL + SZ 4-0 L18IN ABSRB UD L19MM PS-2 3/8 CIR MCP496G

## (undated) DEVICE — CRADLE POS PRONE 24 X 5 X 3 IN ARM N COMPR NO CVR FOAM DISP

## (undated) DEVICE — PACK PROCEDURE SURG SURGERYARTHROSCOPY KNEE

## (undated) DEVICE — 3M™ STERI-STRIP™ COMPOUND BENZOIN TINCTURE 40 BAGS/CARTON 4 CARTONS/CASE C1544: Brand: 3M™ STERI-STRIP™

## (undated) DEVICE — PAD,ABDOMINAL,8"X10",ST,LF: Brand: MEDLINE

## (undated) DEVICE — DRAPE,U/ SHT,SPLIT,PLAS,STERIL: Brand: MEDLINE

## (undated) DEVICE — GOWN SIRUS NONREIN XL W/TWL: Brand: MEDLINE INDUSTRIES, INC.

## (undated) DEVICE — Device: Brand: PILLOW, GENTLETOUCH, 7 INCH RT

## (undated) DEVICE — JP 3-SPRING RES W/10FR PVC DRAIN/TR: Brand: CARDINAL HEALTH

## (undated) DEVICE — GLOVE ORANGE PI 7 1/2   MSG9075

## (undated) DEVICE — SOLUTION IRRIG 5L LAC R BG

## (undated) DEVICE — 3M™ STERI-STRIP™ REINFORCED ADHESIVE SKIN CLOSURES, R1547, 1/2 IN X 4 IN (12 MM X 100 MM), 6 STRIPS/ENVELOPE: Brand: 3M™ STERI-STRIP™

## (undated) DEVICE — Device

## (undated) DEVICE — GLOVE SURG SZ 65 THK91MIL LTX FREE SYN POLYISOPRENE

## (undated) DEVICE — GLOVE ORANGE PI 8 1/2   MSG9085